# Patient Record
Sex: MALE | Race: WHITE | Employment: FULL TIME | URBAN - METROPOLITAN AREA
[De-identification: names, ages, dates, MRNs, and addresses within clinical notes are randomized per-mention and may not be internally consistent; named-entity substitution may affect disease eponyms.]

---

## 2017-01-03 ENCOUNTER — DOCUMENTATION ONLY (OUTPATIENT)
Dept: SLEEP MEDICINE | Age: 32
End: 2017-01-03

## 2017-05-30 RX ORDER — LOSARTAN POTASSIUM 100 MG/1
TABLET ORAL
Qty: 90 TAB | Refills: 3 | Status: SHIPPED | OUTPATIENT
Start: 2017-05-30 | End: 2018-06-01 | Stop reason: SDUPTHER

## 2017-05-30 RX ORDER — ATENOLOL 50 MG/1
TABLET ORAL
Qty: 90 TAB | Refills: 3 | Status: SHIPPED | OUTPATIENT
Start: 2017-05-30 | End: 2018-06-01 | Stop reason: SDUPTHER

## 2017-06-22 ENCOUNTER — OFFICE VISIT (OUTPATIENT)
Dept: CARDIOLOGY CLINIC | Age: 32
End: 2017-06-22

## 2017-06-22 VITALS
HEART RATE: 70 BPM | SYSTOLIC BLOOD PRESSURE: 126 MMHG | HEIGHT: 73 IN | RESPIRATION RATE: 22 BRPM | WEIGHT: 315 LBS | BODY MASS INDEX: 41.75 KG/M2 | OXYGEN SATURATION: 97 % | DIASTOLIC BLOOD PRESSURE: 70 MMHG

## 2017-06-22 DIAGNOSIS — E66.01 MORBID OBESITY DUE TO EXCESS CALORIES (HCC): ICD-10-CM

## 2017-06-22 DIAGNOSIS — F41.9 ANXIETY: ICD-10-CM

## 2017-06-22 DIAGNOSIS — I49.3 PVC'S (PREMATURE VENTRICULAR CONTRACTIONS): ICD-10-CM

## 2017-06-22 DIAGNOSIS — I10 ESSENTIAL HYPERTENSION: Primary | ICD-10-CM

## 2017-06-22 DIAGNOSIS — R00.2 PALPITATIONS: ICD-10-CM

## 2017-06-22 NOTE — MR AVS SNAPSHOT
Visit Information Date & Time Provider Department Dept. Phone Encounter #  
 6/22/2017 10:20 AM Latrice Victor MD [facility] Alexa Adams [phone] [id number] Your Appointments 12/22/2017 10:00 AM  
ESTABLISHED PATIENT with Latrice Victor MD  
[facility] ([address]) Appt Note: 6 MO FUP  
 [id number] Michelle Patrick [address] Sen [phone] [address] [address] [address] Upcoming Health Maintenance Date Due Pneumococcal 19-64 Medium Risk (1 of 1 - PPSV23) 1/4/2003 DTaP/Tdap/Td series (1 - Tdap) 1/4/2005 INFLUENZA AGE 9 TO ADULT 8/1/2017 Allergies as of 6/22/2017  Review Complete On: 6/22/2017 By: Jodi Fuentes LPN Severity Noted Reaction Type Reactions Penicillin G High 01/12/2012    Hives Current Immunizations  Never Reviewed No immunizations on file. Not reviewed this visit You Were Diagnosed With   
  
 Codes Comments Essential hypertension    -  Primary ICD-10-CM: I10 
ICD-9-CM: 401.9 Vitals BP Pulse Resp Height(growth percentile) Weight(growth percentile) SpO2  
 126/70 (BP 1 Location: Left arm, BP Patient Position: Sitting) 70 22 6' 1\" (1.854 m) 343 lb (155.6 kg) 97% BMI Smoking Status 45.25 kg/m2 Current Every Day Smoker Vitals History BMI and BSA Data Body Mass Index Body Surface Area  
 45.25 kg/m 2 2.83 m 2 Preferred Pharmacy Pharmacy Name Phone [id number] [address] [facility] [phone] Your Updated Medication List  
  
   
This list is accurate as of: 6/22/17 11:34 AM.  Always use your most recent med list.  
  
  
  
  
 allopurinol 300 mg tablet Commonly known as:  Aide Maxon Take  by mouth daily. atenolol 50 mg tablet Commonly known as:  TENORMIN  
 TAKE 1 TABLET BY MOUTH DAILY. FISH OIL 1,000 mg Cap Generic drug:  omega-3 fatty acids-vitamin e Take 1 Cap by mouth two (2) times a day. losartan 100 mg tablet Commonly known as:  COZAAR  
TAKE 1 TABLET BY MOUTH DAILY. We Performed the Following AMB POC EKG ROUTINE W/ 12 LEADS, INTER & REP [00556 CPT(R)] Introducing [facility] SERVICES! Dear Hai Pompa: 
Thank you for requesting a TixAlert account. Our records indicate that you already have an active TixAlert account. You can access your account anytime at [email or web address] Did you know that you can access your hospital and ER discharge instructions at any time in TixAlert? You can also review all of your test results from your hospital stay or ER visit. Additional Information If you have questions, please visit the Frequently Asked Questions section of the TixAlert website at [email or web address] Remember, TixAlert is NOT to be used for urgent needs. For medical emergencies, dial 911. Now available from your iPhone and Android! Please provide this summary of care documentation to your next provider. Your primary care clinician is listed as Niharika Santa. If you have any questions after today's visit, please call [phone].

## 2017-06-22 NOTE — PROGRESS NOTES
Yakelin Olvera MD    [address] [place], [address] [facility]    Office [phone],[place] [phone]  Pager [phone]    Caroline Huff is a 34 y.o. male is here for f/u visit for hx of PVC/HTN    Primary care physician:  Sascha Pimentel MD    Patient Active Problem List   Diagnosis Code    Essential hypertension, benign I10    PVC's (premature ventricular contractions) I49.3    Other and unspecified hyperlipidemia E78.5    Morbid obesity ([address] E66.01       Chief complaint:  Chief Complaint   Patient presents with    Hypertension     last seen 2015    Irregular Heart Beat     1 epsiode of palpitations    Anxiety       Assessment:  HTN  Palpitations - hx of PVC - occurs more often when he is anxious  ED visit - 5/14/15 - palpitations. Morbid obesity      Plan:   Continue meds/ BP controlled  Advised to discussed with primary care physician about treatment for anxiety. Counselled about wt loss/low eula diet/exercise -  Lipids per PCP  Patient wants to follow-up in 6 months. Follow-up 6 months/as needed    I appreciate the opportunity to be involved in Mr. Guerrier. See note below for details. Please do not hesitate to contact us with questions or concerns. Yakelin Olvera MD    Cardiac Testing/ Procedures: A. Cardiac Cath/PCI:    B.ECHO/SAMPSON:    C.StressNuclear/Stress ECHO/Stress test: Stress echo 2/2014 - 8 min;Nml; NmlEF    D. Vascular:    E. EP: 7/30/15 - SR/no PAC/1 PVC; Diary entries correlate with SR    F. Miscellaneous:    Subjective:  Caroline Huff is a 34 y.o. male who returns for follow up visit    Was doing well until recently when he had an episode of palpitations. Usually his symptoms are associated with anxiety. . No chest pain. No dyspnea.  No swelling LE.  has had a hard time losing weight    ROS:  (bold if positive, if negative)             Medications before admission:    Current Outpatient Prescriptions   Medication Sig Dispense    losartan (COZAAR) 100 mg tablet TAKE 1 TABLET BY MOUTH DAILY. 90 Tab    atenolol (TENORMIN) 50 mg tablet TAKE 1 TABLET BY MOUTH DAILY. 90 Tab    allopurinol (ZYLOPRIM) 300 mg tablet Take  by mouth daily.  omega-3 fatty acids-vitamin e (FISH OIL) 1,000 mg cap Take 1 Cap by mouth two (2) times a day. No current facility-administered medications for this visit. Physical Exam:  Visit Vitals    /70 (BP 1 Location: Left arm, BP Patient Position: Sitting)    Pulse 70    Resp 22    Ht 6' 1\" (1.854 m)    Wt 343 lb (155.6 kg)    SpO2 97%    BMI 45.25 kg/m2          Gen: Well-developed, well-nourished, in no acute distress, obese  Neck: Supple,No JVD, No Carotid Bruit,   Resp: No accessory muscle use, Clear breath sounds, No rales or rhonchi  Card: Regular Rate,Rythm,Normal S1, S2, No murmurs, rubs or gallop. No thrills.    Abd:  Soft, non-tender, non-distended,BS+,   MSK: No cyanosis  Skin: No rashes    Neuro: moving all four extremities , follows commands appropriately  Psych:  Good insight, oriented to person, place , alert, Nml Affect  LE: No edema    EKG: Sinus rhythm, normal axis, normal intervals      LABS:        Lab Results   Component Value Date/Time    WBC 10.7 05/24/2015 06:30 PM    HGB 14.1 05/24/2015 06:30 PM    HCT 42.4 05/24/2015 06:30 PM    PLATELET 244 76/10/0594 06:30 PM    MCV 88.1 05/24/2015 06:30 PM     Lab Results   Component Value Date/Time    Sodium 138 05/24/2015 06:30 PM    Potassium 4.2 05/24/2015 06:30 PM    Chloride 101 05/24/2015 06:30 PM    CO2 29 05/24/2015 06:30 PM    Anion gap 8 05/24/2015 06:30 PM    Glucose 108 05/24/2015 06:30 PM    BUN 14 05/24/2015 06:30 PM    Creatinine 0.88 05/24/2015 06:30 PM    BUN/Creatinine ratio 16 05/24/2015 06:30 PM    GFR est AA >60 05/24/2015 06:30 PM    GFR est non-AA >60 05/24/2015 06:30 PM    Calcium 9.2 05/24/2015 06:30 PM       No results found for: APTT  No results found for: INR  No components found for: LAST Raleigh Fernandez MD

## 2017-07-21 ENCOUNTER — HOSPITAL ENCOUNTER (EMERGENCY)
Age: 32
Discharge: HOME | End: 2017-07-21
Attending: EMERGENCY MEDICINE | Admitting: EMERGENCY MEDICINE
Payer: COMMERCIAL

## 2017-07-21 VITALS
DIASTOLIC BLOOD PRESSURE: 84 MMHG | SYSTOLIC BLOOD PRESSURE: 161 MMHG | RESPIRATION RATE: 16 BRPM | BODY MASS INDEX: 41.75 KG/M2 | HEIGHT: 73 IN | HEART RATE: 68 BPM | WEIGHT: 315 LBS | OXYGEN SATURATION: 99 % | TEMPERATURE: 98.5 F

## 2017-07-21 DIAGNOSIS — Z20.3 RABIES EXPOSURE: Primary | ICD-10-CM

## 2017-07-21 PROCEDURE — 96372 THER/PROPH/DIAG INJ SC/IM: CPT

## 2017-07-21 PROCEDURE — 90471 IMMUNIZATION ADMIN: CPT

## 2017-07-21 PROCEDURE — 90675 RABIES VACCINE IM: CPT | Performed by: EMERGENCY MEDICINE

## 2017-07-21 PROCEDURE — 99282 EMERGENCY DEPT VISIT SF MDM: CPT

## 2017-07-21 PROCEDURE — 74011250636 HC RX REV CODE- 250/636: Performed by: EMERGENCY MEDICINE

## 2017-07-21 PROCEDURE — 90375 RABIES IG IM/SC: CPT | Performed by: EMERGENCY MEDICINE

## 2017-07-21 RX ADMIN — RABIES IMMUNE GLOBULIN (HUMAN) 3225 UNITS: 150 INJECTION INTRAMUSCULAR at 12:47

## 2017-07-21 RX ADMIN — Medication 2.5 UNITS: at 12:45

## 2017-07-21 NOTE — ED PROVIDER NOTES
HPI Comments: 34 y.o. male with past medical history significant for HTN, hyperlipidemia and anxiety who presents ambulatory from home with chief complaint of animal bite. Pt states he was moving bats out of his home when one of the bats escaped and he felt \"something touch me\". Pt released all of the bats with the exception of one, who Animal Control tested for rabies. That bat was negative for rabies. Pt lives at home with his wife and 3year-old son, both of who have not had any contact with the bats. Pt is UTD on tetanus. There are no other acute medical concerns at this time. Social hx: current every day tobacco smoker; uses EtOH rarely; denies illicit drug use  PCP: Mica Howard MD    Note written by Marta Nolan, as dictated by Blair Barragan MD 11:34 AM        The history is provided by the patient. Past Medical History:   Diagnosis Date    Anxiety     Asthma     DJD (degenerative joint disease) of knee     DJD (degenerative joint disease) of lumbar spine     Essential hypertension     Gout     Hyperlipidemia     Morbid obesity ([address]     Psoriasis        Past Surgical History:   Procedure Laterality Date    HX ORTHOPAEDIC      arthroscopic bilateral knees    ORAL SURGERY PROCEDURE      SINUS SURGERY PROC UNLISTED           Family History:   Problem Relation Age of Onset   [facility] [place] Mother     Hypertension Father     Cancer Father        Social History     Social History    Marital status: SINGLE     Spouse name: N/A    Number of children: N/A    Years of education: N/A     Occupational History    Not on file.      Social History Main Topics    Smoking status: Current Every Day Smoker    Smokeless tobacco: Never Used    Alcohol use Yes      Comment: rare    Drug use: No    Sexual activity: Yes     Other Topics Concern    Not on file     Social History Narrative         ALLERGIES: Penicillin g    Review of Systems   Skin:        Possible animal bite   All other systems reviewed and are negative. Vitals:    07/21/17 1121   BP: 161/84   Pulse: 68   Resp: 16   Temp: 98.5 °F (36.9 °C)   SpO2: 99%   Weight: (!) 161 kg (355 lb)   Height: 6' 1\" (1.854 m)            Physical Exam   Physical Examination: General appearance - alert, well appearing, and in no distress, oriented to person, place, and time and normal appearing weight  Eyes - pupils equal and reactive, extraocular eye movements intact  Neck - supple, no significant adenopathy  Chest - clear to auscultation, no wheezes, rales or rhonchi, symmetric air entry  Heart - normal rate, regular rhythm, normal S1, S2, no murmurs, rubs, clicks or gallops  Abdomen - soft, nontender, nondistended, no masses or organomegaly  Back exam - full range of motion, no tenderness, palpable spasm or pain on motion  Neurological - alert, oriented, normal speech, no focal findings or movement disorder noted  Musculoskeletal - no joint tenderness, deformity or swelling  Extremities - peripheral pulses normal, no pedal edema, no clubbing or cyanosis  Skin - normal coloration and turgor, no rashes, no suspicious skin lesions noted  MDM  Number of Diagnoses or Management Options  Rabies exposure:   Patient Progress  Patient progress: improved    ED Course       Procedures  Will give rabies series.

## 2017-07-21 NOTE — DISCHARGE INSTRUCTIONS
July 24, 2017 July 28, 2017 August 4, 2017          Preventing Rabies Infection: Care Instructions  Your Care Instructions  Rabies is a disease caused by a virus that can affect the brain and nervous system. You can get rabies when you are exposed to an animal that has rabies. This can happen through a bite, scratch, or other contact. Your doctor can use two medicines to help your body fight the virus before it causes an infection. One is rabies immunoglobulin. It works by giving your body a type of protein called an antibody to stop the rabies virus. The other medicine is the rabies vaccine. It helps your body produce its own protection against the rabies virus. When you get these shots before serious symptoms appear, you should not get infected with rabies. Your doctor will give you a shot schedule. Make sure that you do not miss any doses. You need to get all the doses for the rabies vaccine to work. If you are exposed and have not been vaccinated against rabies, you should get 4 doses of rabies vaccine. · Get 1 dose right away. You should also get a rabies immunoglobulin shot when you get the first dose. · Get more doses on the 3rd, 7th, and 14th days. If you're exposed and have been vaccinated before, you should get 2 doses of rabies vaccine. · Get 1 dose right away. In this case, you do not need rabies immunoglobulin. · Get another on the 3rd day. You might also get a shot to prevent rabies if you handle animals often. Or you may get one if you plan to travel to places where rabies is a risk. Follow-up care is a key part of your treatment and safety. Be sure to make and go to all appointments, and call your doctor if you are having problems. It's also a good idea to know your test results and keep a list of the medicines you take. How can you care for yourself at home? · Do not drive or use machines if the rabies vaccine makes you dizzy. · Both types of rabies shots may cause fever.  And both may cause pain or stiffness where you got the shot. If your doctor recommends it, take an over-the-counter pain medicine, such as acetaminophen (Tylenol), ibuprofen (Advil, Motrin), or naproxen (Aleve), as needed. Read and follow all instructions on the label. · Do not take two or more pain medicines at the same time unless the doctor told you to. Many pain medicines have acetaminophen, which is Tylenol. Too much acetaminophen (Tylenol) can be harmful. To prevent contact with rabies  · Make sure your dog, cat, or ferret gets the rabies vaccine. · Avoid all contact with bats. · Never touch or try to pet or catch wild animals. This includes raccoons, skunks, foxes, and coyotes. · Secure trash and other items that attract animals. · Secure open areas of your home. Close off pet doors, chimneys, unscreened windows, or any place that wild or stray animals could get in. · Never handle a dead or wounded animal.  To take care of an animal bite  · Wash any animal bite or area of exposure right away. Use soap and water. · Call your doctor to find out how to care for your wound. · If the animal is a dog, cat, or pet ferret, try to find and contact the owner. If you can't find the owner, call the local animal control to safely catch the animal.  · If the animal is wild, do not try to catch or kill it. Find out what type of animal it is. Note whether it is acting normal. Report it to the local animal control. · Contact the local or state health department to report a bite or a severe scratch from an animal.  · Ask your doctor if you need a tetanus shot. When should you call for help? Call your doctor now or seek immediate medical care if:  · You have been bitten or scratched by an animal, and you do not know if it has had rabies vaccine. · You develop hives or a skin rash after you get rabies shots.   Watch closely for changes in your health, and be sure to contact your doctor if you notice any changes in your health. Where can you learn more? Go to http://maurice-kylee.info/. Enter U105 in the search box to learn more about \"Preventing Rabies Infection: Care Instructions. \"  Current as of: March 3, 2017  Content Version: 11.3  © 0563-1504 [employer]. Care instructions adapted under license by [facility] (which disclaims liability or warranty for this information). If you have questions about a medical condition or this instruction, always ask your healthcare professional. [address] any warranty or liability for your use of this information. We hope that we have addressed all of your medical concerns. The examination and treatment you received in the Emergency Department were for an emergent problem and were not intended as complete care. It is important that you follow up with your healthcare provider(s) for ongoing care. If your symptoms worsen or do not improve as expected, and you are unable to reach your usual health care provider(s), you should return to the Emergency Department. Today's healthcare is undergoing tremendous change, and patient satisfaction surveys are one of the many tools to assess the quality of medical care. You may receive a survey from the [facility] regarding your experience in the Emergency Department. I hope that your experience has been completely positive, particularly the medical care that I provided. As such, please participate in the survey; anything less than excellent does not meet my expectations or intentions. [facility] and [address] [place] [address] participate in nationally recognized quality of care measures. If your blood pressure is greater than 120/80, as reported below, we urge that you seek medical care to address the potential of high blood pressure, commonly known as hypertension.    Hypertension can be hereditary or can be caused by certain medical conditions, pain, stress, or \"white coat syndrome. \"       Please make an appointment with your health care provider(s) for follow up of your Emergency Department visit. VITALS:   Patient Vitals for the past 8 hrs:   Temp Pulse Resp BP SpO2   07/21/17 1121 98.5 °F (36.9 °C) 68 16 161/84 99 %          Thank you for allowing us to provide you with medical care today. We realize that you have many choices for your emergency care needs. Please choose us in the future for any continued health care needs. Annelle Cabot Acie Butts, [address]: [phone]            No results found for this or any previous visit (from the past 24 hour(s)). No results found.

## 2017-07-24 ENCOUNTER — HOSPITAL ENCOUNTER (OUTPATIENT)
Dept: INFUSION THERAPY | Age: 32
Discharge: HOME | End: 2017-07-24
Payer: COMMERCIAL

## 2017-07-24 VITALS
TEMPERATURE: 98.1 F | DIASTOLIC BLOOD PRESSURE: 94 MMHG | RESPIRATION RATE: 18 BRPM | SYSTOLIC BLOOD PRESSURE: 153 MMHG | HEART RATE: 74 BPM

## 2017-07-24 PROCEDURE — 90471 IMMUNIZATION ADMIN: CPT

## 2017-07-24 PROCEDURE — 90675 RABIES VACCINE IM: CPT | Performed by: PHYSICIAN ASSISTANT

## 2017-07-24 PROCEDURE — 96372 THER/PROPH/DIAG INJ SC/IM: CPT

## 2017-07-24 PROCEDURE — 74011250636 HC RX REV CODE- 250/636: Performed by: PHYSICIAN ASSISTANT

## 2017-07-24 RX ADMIN — RABIES VACCINE 2.5 UNITS: KIT at 16:53

## 2017-07-24 NOTE — PROGRESS NOTES
[facility] SHORT VISIT NOTE    1645 hrs    Pt arrived to [facility] ambulatory and in no distress for Rabivert 1/3 Denies any new complaints. Blood pressure (!) 153/94, pulse 74, temperature 98.1 °F (36.7 °C), resp. rate 18. Medication given:  Rabivert IM Left Deltoid    1700 hrs   Discharged home ambulatory and in no distress. Tolerated treatment well.  Next appointment 07/28/17 @ 5 pm.

## 2017-07-26 NOTE — ED NOTES
Infusion center called on 7/24/17 stating that they do not have follow up vaccines for days 3,7,&14. Rx faxed over to the infusion center for pt.    Gissel Rivera PA-C

## 2017-07-28 ENCOUNTER — HOSPITAL ENCOUNTER (OUTPATIENT)
Dept: INFUSION THERAPY | Age: 32
Discharge: HOME | End: 2017-07-28
Payer: COMMERCIAL

## 2017-07-28 VITALS
DIASTOLIC BLOOD PRESSURE: 78 MMHG | HEART RATE: 83 BPM | TEMPERATURE: 98.4 F | SYSTOLIC BLOOD PRESSURE: 139 MMHG | RESPIRATION RATE: 18 BRPM

## 2017-07-28 PROCEDURE — 90471 IMMUNIZATION ADMIN: CPT

## 2017-07-28 PROCEDURE — 90675 RABIES VACCINE IM: CPT | Performed by: PHYSICIAN ASSISTANT

## 2017-07-28 PROCEDURE — 96372 THER/PROPH/DIAG INJ SC/IM: CPT

## 2017-07-28 PROCEDURE — 74011250636 HC RX REV CODE- 250/636: Performed by: PHYSICIAN ASSISTANT

## 2017-07-28 RX ADMIN — RABIES VACCINE 2.5 UNITS: KIT at 17:44

## 2017-07-28 NOTE — PROGRESS NOTES
129 [facility] SHORT VISIT NOTE    1730 hrs  Pt arrived to [place] ambulatory and in no distress for Rabavert. Denies any new complaints. Blood pressure 139/78, pulse 83, temperature 98.4 °F (36.9 °C), resp. rate 18. Medication given:  Rabavert IM Left arm    1750 hrs Discharged home ambulatory and in no distress. Tolerated treatment well.  Next appointment 08/04/17 @ 5 pm.

## 2017-08-04 ENCOUNTER — HOSPITAL ENCOUNTER (OUTPATIENT)
Dept: INFUSION THERAPY | Age: 32
Discharge: HOME | End: 2017-08-04
Payer: COMMERCIAL

## 2017-08-04 VITALS
SYSTOLIC BLOOD PRESSURE: 137 MMHG | TEMPERATURE: 98.1 F | DIASTOLIC BLOOD PRESSURE: 74 MMHG | RESPIRATION RATE: 18 BRPM | HEART RATE: 79 BPM

## 2017-08-04 PROCEDURE — 90675 RABIES VACCINE IM: CPT | Performed by: PHYSICIAN ASSISTANT

## 2017-08-04 PROCEDURE — 74011250636 HC RX REV CODE- 250/636: Performed by: PHYSICIAN ASSISTANT

## 2017-08-04 PROCEDURE — 96372 THER/PROPH/DIAG INJ SC/IM: CPT

## 2017-08-04 PROCEDURE — 90471 IMMUNIZATION ADMIN: CPT

## 2017-08-04 RX ADMIN — RABIES VACCINE 2.5 UNITS: KIT at 17:22

## 2017-08-04 NOTE — PROGRESS NOTES
SHORT OPIC NOTE    Patient arrived at [facility] ambulatory and in no distress for 3/3 Rabies. Assessment completed and patient denied complaints. Patient Vitals for the past 12 hrs:   Temp Pulse Resp BP   08/04/17 1716 98.1 °F (36.7 °C) 79 18 137/74       Medication received:  Rabies IM (left shoulder)    Patient tolerated procedure well and without difficulty. DC'd from [facility]. Next appointment not  scheduled.

## 2017-08-04 NOTE — PROGRESS NOTES
Problem: Knowledge Deficit  Goal: *Verbalizes understanding of procedures and medications  Outcome: Progressing Towards Goal  Patient here for rabies vaccine.

## 2017-10-25 ENCOUNTER — TELEPHONE (OUTPATIENT)
Dept: CARDIOLOGY CLINIC | Age: 32
End: 2017-10-25

## 2017-10-26 ENCOUNTER — OFFICE VISIT (OUTPATIENT)
Dept: CARDIOLOGY CLINIC | Age: 32
End: 2017-10-26

## 2017-10-26 VITALS
SYSTOLIC BLOOD PRESSURE: 142 MMHG | HEART RATE: 68 BPM | DIASTOLIC BLOOD PRESSURE: 80 MMHG | WEIGHT: 315 LBS | HEIGHT: 73 IN | OXYGEN SATURATION: 98 % | BODY MASS INDEX: 41.75 KG/M2

## 2017-10-26 DIAGNOSIS — I49.3 PVC'S (PREMATURE VENTRICULAR CONTRACTIONS): ICD-10-CM

## 2017-10-26 DIAGNOSIS — R07.9 CHEST PAIN, UNSPECIFIED TYPE: ICD-10-CM

## 2017-10-26 DIAGNOSIS — I10 ESSENTIAL HYPERTENSION, BENIGN: Primary | ICD-10-CM

## 2017-10-26 DIAGNOSIS — E66.01 MORBID OBESITY (HCC): ICD-10-CM

## 2017-10-26 DIAGNOSIS — R06.00 DYSPNEA, UNSPECIFIED TYPE: ICD-10-CM

## 2017-10-26 RX ORDER — PANTOPRAZOLE SODIUM 40 MG/1
40 TABLET, DELAYED RELEASE ORAL DAILY
COMMUNITY
End: 2018-03-09

## 2017-10-26 RX ORDER — QUETIAPINE FUMARATE 50 MG/1
50 TABLET, FILM COATED ORAL
COMMUNITY
End: 2018-03-09

## 2017-10-26 NOTE — MR AVS SNAPSHOT
Visit Information Date & Time Provider Department Dept. Phone Encounter #  
 10/26/2017  1:40 PM Sidra Anaya MD [facility] [place] [place] [phone] [id number] Your Appointments 12/22/2017 10:00 AM  
ESTABLISHED PATIENT with Sidra Anaya MD  
[facility] ([address]) Appt Note: 6 MO FUP  
 [id number] [address] [phone] [id number] [place] [address] Upcoming Health Maintenance Date Due Pneumococcal 19-64 Medium Risk (1 of 1 - PPSV23) 1/4/2003 DTaP/Tdap/Td series (1 - Tdap) 1/4/2005 INFLUENZA AGE 9 TO ADULT 8/1/2017 Allergies as of 10/26/2017  Review Complete On: 10/26/2017 By: Dale Section Severity Noted Reaction Type Reactions Penicillin G High 01/12/2012    Hives Current Immunizations  Reviewed on 8/4/2017 Name Date Rabies Immune Globulin 7/21/2017 12:47 PM  
 Rabies- IM Fibroblast Culture 8/4/2017  5:22 PM, 7/28/2017  5:44 PM,  Incomplete, 7/24/2017  4:53 PM, 7/21/2017 12:45 PM  
  
 Not reviewed this visit You Were Diagnosed With   
  
 Codes Comments Essential hypertension, benign    -  Primary ICD-10-CM: I10 
ICD-9-CM: 401.1 PVC's (premature ventricular contractions)     ICD-10-CM: I49.3 ICD-9-CM: 427.69 Morbid obesity ([address]     ICD-10-CM: E66.01 
ICD-9-CM: 278.01 Vitals BP Pulse Height(growth percentile) Weight(growth percentile) SpO2 BMI  
 142/80 (BP 1 Location: Left arm) 68 6' 1\" (1.854 m) 348 lb 9.6 oz (158.1 kg) 98% 45.99 kg/m2 Smoking Status Former Smoker Vitals History BMI and BSA Data Body Mass Index Body Surface Area 45.99 kg/m 2 2.85 m 2 Preferred Pharmacy Pharmacy Name Phone [id number] [address] [facility] [phone] Your Updated Medication List  
  
 This list is accurate as of: 10/26/17  2:16 PM.  Always use your most recent med list.  
  
  
  
  
 allopurinol 300 mg tablet Commonly known as:  Almetta Hefty Take  by mouth daily. atenolol 50 mg tablet Commonly known as:  TENORMIN  
TAKE 1 TABLET BY MOUTH DAILY. FISH OIL 1,000 mg Cap Generic drug:  omega-3 fatty acids-vitamin e Take 1 Cap by mouth two (2) times a day. losartan 100 mg tablet Commonly known as:  COZAAR  
TAKE 1 TABLET BY MOUTH DAILY. PROTONIX 40 mg tablet Generic drug:  pantoprazole Take 40 mg by mouth daily. SEROquel 50 mg tablet Generic drug:  QUEtiapine Take 50 mg by mouth nightly. Introducing [facility]! Dear Casimiro Deutsch: 
Thank you for requesting a Patterns account. Our records indicate that you already have an active Patterns account. You can access your account anytime at [email or web address] Did you know that you can access your hospital and ER discharge instructions at any time in Patterns? You can also review all of your test results from your hospital stay or ER visit. Additional Information If you have questions, please visit the Frequently Asked Questions section of the Patterns website at [email or web address] Remember, Patterns is NOT to be used for urgent needs. For medical emergencies, dial 911. Now available from your iPhone and Android! Please provide this summary of care documentation to your next provider. Your primary care clinician is listed as Katrina Mejía. If you have any questions after today's visit, please call [phone].

## 2017-10-27 ENCOUNTER — TELEPHONE (OUTPATIENT)
Dept: SLEEP MEDICINE | Age: 32
End: 2017-10-27

## 2017-10-27 DIAGNOSIS — G25.9 MOVEMENT DISORDER: ICD-10-CM

## 2017-10-27 DIAGNOSIS — G47.33 OSA (OBSTRUCTIVE SLEEP APNEA): Primary | ICD-10-CM

## 2017-10-27 NOTE — TELEPHONE ENCOUNTER
Patient called to schedule PSG which was cancelled by him earlier. He was last seen 11/14/16. Please write a new order for PSG. Thank you.   Gemma, I have scheduled him for Jan 5th 2018, per his request.

## 2017-10-28 NOTE — TELEPHONE ENCOUNTER
Orders Placed This Encounter    POLYSOMNOGRAPHY 1 NIGHT     Standing Status:   Future     Standing Expiration Date:   4/28/2018     Order Specific Question:   Reason for Exam     Answer:   SURENDRA / Movement Disorder

## 2017-11-10 ENCOUNTER — CLINICAL SUPPORT (OUTPATIENT)
Dept: CARDIOLOGY CLINIC | Age: 32
End: 2017-11-10

## 2017-11-10 DIAGNOSIS — R06.02 SOB (SHORTNESS OF BREATH): ICD-10-CM

## 2017-11-10 DIAGNOSIS — E66.01 MORBID OBESITY (HCC): ICD-10-CM

## 2017-11-10 DIAGNOSIS — I10 ESSENTIAL HYPERTENSION, BENIGN: ICD-10-CM

## 2017-11-10 DIAGNOSIS — I49.3 PVC'S (PREMATURE VENTRICULAR CONTRACTIONS): Primary | ICD-10-CM

## 2017-11-10 NOTE — PROGRESS NOTES
Explained procedure to patient, monitoring EKG/HR/BP, obtaining resting images, walking on treadmill, obtaining post exercise images, and risks/discomforts. All concerns and questions addressed. Consent obtained. See scanned report. Dr. Esthela Maloney ordered and Dr. Esthela Maloney read study. ID verified per protocol. Pt  reported no symptoms at completion of protocol.

## 2017-11-15 ENCOUNTER — DOCUMENTATION ONLY (OUTPATIENT)
Dept: CARDIOLOGY CLINIC | Age: 32
End: 2017-11-15

## 2018-01-26 ENCOUNTER — HOSPITAL ENCOUNTER (OUTPATIENT)
Dept: SLEEP MEDICINE | Age: 33
Discharge: HOME | End: 2018-01-26
Attending: INTERNAL MEDICINE
Payer: COMMERCIAL

## 2018-01-26 VITALS
SYSTOLIC BLOOD PRESSURE: 141 MMHG | DIASTOLIC BLOOD PRESSURE: 82 MMHG | TEMPERATURE: 98.4 F | OXYGEN SATURATION: 99 % | HEART RATE: 78 BPM | HEIGHT: 72 IN | BODY MASS INDEX: 42.66 KG/M2 | WEIGHT: 315 LBS

## 2018-01-26 DIAGNOSIS — G25.9 MOVEMENT DISORDER: ICD-10-CM

## 2018-01-26 DIAGNOSIS — G47.33 OSA (OBSTRUCTIVE SLEEP APNEA): ICD-10-CM

## 2018-01-26 PROCEDURE — 95810 POLYSOM 6/> YRS 4/> PARAM: CPT | Performed by: INTERNAL MEDICINE

## 2018-01-29 ENCOUNTER — TELEPHONE (OUTPATIENT)
Dept: SLEEP MEDICINE | Age: 33
End: 2018-01-29

## 2018-01-29 DIAGNOSIS — G47.33 OSA (OBSTRUCTIVE SLEEP APNEA): Primary | ICD-10-CM

## 2018-01-29 DIAGNOSIS — I10 ESSENTIAL HYPERTENSION, BENIGN: ICD-10-CM

## 2018-01-30 ENCOUNTER — DOCUMENTATION ONLY (OUTPATIENT)
Dept: SLEEP MEDICINE | Age: 33
End: 2018-01-30

## 2018-01-30 NOTE — TELEPHONE ENCOUNTER
Fransisco De Paz is to be contacted by lead sleep technologist regarding results of Sleep Testing which was indicative of an average AHI of 12 per hour with an SpO2 addison of 77% and SpO2 of < 88% being 4.7 minutes. An APAP prescription has been written and patient will be contacted by office staff regarding follow-up  in 2-3 months after initiation of therapy. Encounter Diagnoses   Name Primary?  SURENDRA (obstructive sleep apnea) Yes    Essential hypertension, benign        Orders Placed This Encounter    AMB SUPPLY ORDER     Diagnosis: (G47.33) SURENDRA (obstructive sleep apnea)  (primary encounter diagnosis)     Positive Airway Pressure Therapy: Duration of need: 99 months. Respironics DreamStation ( Unit - Auto set Mode): Auto - PAP: 4 - 20 cmH2O; Optistart enabled. Ramp Time: 30 Minutes; Flex: 2. Remote monitoring enrollment.  Heated Humidifier     Oral/Nasal Combo Mask 1 every 3 months.  Oral Cushion Combo Mask (Replace) 2 per month.  Nasal Pillows Combo Mask (Replace) 2 per month.  Full Face Mask 1 every 3 months.  Full Face Mask Cushion 1 per month.  Nasal Cushion (Replace) 2 per month.  Nasal Pillows (Replace) 2 per month.  Nasal Interface Mask 1 every 3 months.  Headgear 1 every 6 months.  Chinstrap 1 every 6 months.  Tubing 1 every 3 months.  Filter(s) Disposable 2 per month.  Filter(s) Non-Disposable 1 every 6 months.  Oral Interface 1 every 3 months. [address] for [place]ed [place] (Replace) 1 every 6 months.  Tubing with heating element 1 every 3 months. Perform Mask Fitting per patient preference and comfort - replace as above. Jessica Washington MD, FAASM; NPI: [id number]  Electronically signed. 01/30/18

## 2018-01-31 ENCOUNTER — DOCUMENTATION ONLY (OUTPATIENT)
Dept: SLEEP MEDICINE | Age: 33
End: 2018-01-31

## 2018-03-09 ENCOUNTER — OFFICE VISIT (OUTPATIENT)
Dept: CARDIOLOGY CLINIC | Age: 33
End: 2018-03-09

## 2018-03-09 VITALS
SYSTOLIC BLOOD PRESSURE: 136 MMHG | OXYGEN SATURATION: 98 % | HEIGHT: 72 IN | HEART RATE: 72 BPM | RESPIRATION RATE: 20 BRPM | WEIGHT: 315 LBS | BODY MASS INDEX: 42.66 KG/M2 | DIASTOLIC BLOOD PRESSURE: 84 MMHG

## 2018-03-09 DIAGNOSIS — I10 ESSENTIAL HYPERTENSION, BENIGN: Primary | ICD-10-CM

## 2018-03-09 DIAGNOSIS — E66.01 MORBID OBESITY (HCC): ICD-10-CM

## 2018-03-09 DIAGNOSIS — I49.3 PVC'S (PREMATURE VENTRICULAR CONTRACTIONS): ICD-10-CM

## 2018-03-09 NOTE — PROGRESS NOTES
Donald Sanford MD    [address] [facility] [place], [address] [facility]    Office [phone],RTA [phone]  Pager [phone]    Silvia English is a 28 y.o. male is here for f/u visit . Primary care physician:  Baldomero Torres MD    Patient Active Problem List   Diagnosis Code    Essential hypertension, benign I10    PVC's (premature ventricular contractions) I49.3    Other and unspecified hyperlipidemia E78.5    Morbid obesity ([address] E66.01       Chief complaint:  Chief Complaint   Patient presents with    Chest Pain (Angina)    Hypertension    Palpitations     Dear Dr. Fabiana Dickey,    I had the pleasure of seeing Mr. Andrew Kenyon in the office today. Assessment:    HTN  Palpitations - hx of PVC - occurs more often when he is anxious  ED visit - 5/14/15 - palpitations. Morbid obesity      Plan:     Stress echocardiogram - nml 11/22/17  Counselled about wt loss/low eula diet/exercise -  Lipids per PCP  F/u in 6 mths/prn    I appreciate the opportunity to be involved in Mr. Guerrier. See note below for details. Please do not hesitate to contact us with questions or concerns. Donald Sanford MD    Cardiac Testing/ Procedures: A. Cardiac Cath/PCI:    B.ECHO/SAMPSON:    C.StressNuclear/Stress ECHO/Stress test: Stress echocardiogram November 2017-7 min 31 sec/normal    Stress echo 2/2014 - 8 min;Nml; NmlEF    D. Vascular:    E. EP: 7/30/15 - SR/no PAC/1 PVC; Diary entries correlate with SR    F. Miscellaneous:    Subjective:  Silvia English is a 28 y.o. male who returns for follow up visit    Patient states that he does not have any chest pain or dyspnea. However has trouble losing weight. He has gained weight since last visit. Also trying to quit smoking. ROS:  (bold if positive, if negative)             Medications before admission:    Current Outpatient Prescriptions   Medication Sig Dispense    losartan (COZAAR) 100 mg tablet TAKE 1 TABLET BY MOUTH DAILY. 90 Tab    atenolol (TENORMIN) 50 mg tablet TAKE 1 TABLET BY MOUTH DAILY. 90 Tab    allopurinol (ZYLOPRIM) 300 mg tablet Take  by mouth daily.  omega-3 fatty acids-vitamin e (FISH OIL) 1,000 mg cap Take 1 Cap by mouth two (2) times a day. No current facility-administered medications for this visit. Physical Exam:  Visit Vitals    /84 (BP 1 Location: Left arm)    Pulse 72    Resp 20    Ht 6' (1.829 m)    Wt (!) 374 lb (169.6 kg)    SpO2 98%    BMI 50.72 kg/m2          Gen: Well-developed, well-nourished, in no acute distress, obese  Neck: Supple,No JVD, No Carotid Bruit,   Resp: No accessory muscle use, Clear breath sounds, No rales or rhonchi  Card: Regular Rate,Rythm,Normal S1, S2, No murmurs, rubs or gallop. No thrills.    Abd:  Soft, non-tender, non-distended,BS+,   MSK: No cyanosis  Skin: No rashes    Neuro: moving all four extremities , follows commands appropriately  Psych:  Good insight, oriented to person, place , alert, Nml Affect  LE: No edema    EKG: Sinus rhythm, normal axis, normal intervals      LABS:        Lab Results   Component Value Date/Time    WBC 10.7 05/24/2015 06:30 PM    HGB 14.1 05/24/2015 06:30 PM    HCT 42.4 05/24/2015 06:30 PM    PLATELET 506 23/07/5229 06:30 PM    MCV 88.1 05/24/2015 06:30 PM     Lab Results   Component Value Date/Time    Sodium 138 05/24/2015 06:30 PM    Potassium 4.2 05/24/2015 06:30 PM    Chloride 101 05/24/2015 06:30 PM    CO2 29 05/24/2015 06:30 PM    Anion gap 8 05/24/2015 06:30 PM    Glucose 108 (H) 05/24/2015 06:30 PM    BUN 14 05/24/2015 06:30 PM    Creatinine 0.88 05/24/2015 06:30 PM    BUN/Creatinine ratio 16 05/24/2015 06:30 PM    GFR est AA >60 05/24/2015 06:30 PM    GFR est non-AA >60 05/24/2015 06:30 PM    Calcium 9.2 05/24/2015 06:30 PM       No results found for: APTT  No results found for: INR  No components found for: Kanwal Greco MD

## 2018-03-09 NOTE — MR AVS SNAPSHOT
[address] [facility] [address] [facility] 600 1007 [facility] 
[phone] Patient: Nilam Etienne MRN: [id number] OST:[birth year] Visit Information Date & Time Provider Department Dept. Phone Encounter #  
 3/9/2018  1:20 PM Nati Sánchez MD [facility] Mary Barrett [phone] [id number] Your Appointments 5/7/2018 11:20 AM  
Any with Hugo Banerjee MD  
[facility] Compute ([address]) Appt Note: 1st adherence [address] [facility] [address] [id number] 0[address] [facility] [phone] Upcoming Health Maintenance Date Due DTaP/Tdap/Td series (1 - Tdap) 1/4/2005 Influenza Age 5 to Adult 8/1/2017 Allergies as of 3/9/2018  Review Complete On: 3/9/2018 By: Harmony Bowers Severity Noted Reaction Type Reactions Penicillin G High 01/12/2012    Hives Current Immunizations  Reviewed on 8/4/2017 Name Date Rabies Immune Globulin 7/21/2017 12:47 PM  
 Rabies- IM Fibroblast Culture 8/4/2017  5:22 PM, 7/28/2017  5:44 PM,  Incomplete, 7/24/2017  4:53 PM, 7/21/2017 12:45 PM  
  
 Not reviewed this visit You Were Diagnosed With   
  
 Codes Comments Essential hypertension, benign    -  Primary ICD-10-CM: I10 
ICD-9-CM: 401.1 PVC's (premature ventricular contractions)     ICD-10-CM: I49.3 ICD-9-CM: 427.69 Morbid obesity ([facility] [address]     ICD-10-CM: E66.01 
ICD-9-CM: 278.01 Vitals BP Pulse Resp Height(growth percentile) Weight(growth percentile) SpO2  
 136/84 (BP 1 Location: Left arm) 72 20 6' (1.829 m) (!) 374 lb (169.6 kg) 98% BMI Smoking Status 50.72 kg/m2 Former Smoker Vitals History BMI and BSA Data Body Mass Index Body Surface Area 50.72 kg/m 2 2.94 m 2 Preferred Pharmacy Pharmacy Name Phone [facility] #[id number]- [address] AT [facility] [phone] Your Updated Medication List  
  
   
This list is accurate as of 3/9/18  2:04 PM.  Always use your most recent med list.  
  
  
  
  
 allopurinol 300 mg tablet Commonly known as:  Fidel Card Take  by mouth daily. atenolol 50 mg tablet Commonly known as:  TENORMIN  
TAKE 1 TABLET BY MOUTH DAILY. FISH OIL 1,000 mg Cap Generic drug:  omega-3 fatty acids-vitamin e Take 1 Cap by mouth two (2) times a day. losartan 100 mg tablet Commonly known as:  COZAAR  
TAKE 1 TABLET BY MOUTH DAILY. We Performed the Following AMB POC EKG ROUTINE W/ 12 LEADS, INTER & REP [89741 CPT(R)] Introducing [facility]! Dear Riri Ambriz: 
Thank you for requesting a The[employer] account. Our records indicate that you already have an active TheraVid account. You can access your account anytime at [email or web address]. [employer]ative Software Did you know that you can access your hospital and ER discharge instructions at any time in The[employer]Vid? You can also review all of your test results from your hospital stay or ER visit. Additional Information If you have questions, please visit the Frequently Asked Questions section of the TheraVid website at [email or web address] [employer]/. Remember, TheraVid is NOT to be used for urgent needs. For medical emergencies, dial 911. Now available from your iPhone and Android! Please provide this summary of care documentation to your next provider. Your primary care clinician is listed as Carlos Desouza. If you have any questions after today's visit, please call [phone].

## 2018-05-04 ENCOUNTER — DOCUMENTATION ONLY (OUTPATIENT)
Dept: SLEEP MEDICINE | Age: 33
End: 2018-05-04

## 2018-06-03 ENCOUNTER — HOSPITAL ENCOUNTER (EMERGENCY)
Age: 33
Discharge: HOME | End: 2018-06-03
Attending: EMERGENCY MEDICINE
Payer: COMMERCIAL

## 2018-06-03 ENCOUNTER — APPOINTMENT (OUTPATIENT)
Dept: GENERAL RADIOLOGY | Age: 33
End: 2018-06-03
Attending: EMERGENCY MEDICINE
Payer: COMMERCIAL

## 2018-06-03 VITALS
WEIGHT: 315 LBS | HEIGHT: 73 IN | HEART RATE: 105 BPM | DIASTOLIC BLOOD PRESSURE: 73 MMHG | BODY MASS INDEX: 41.75 KG/M2 | OXYGEN SATURATION: 99 % | RESPIRATION RATE: 18 BRPM | TEMPERATURE: 99 F | SYSTOLIC BLOOD PRESSURE: 161 MMHG

## 2018-06-03 DIAGNOSIS — J45.51 SEVERE PERSISTENT ASTHMA WITH ACUTE EXACERBATION: Primary | ICD-10-CM

## 2018-06-03 PROCEDURE — 74011250637 HC RX REV CODE- 250/637: Performed by: EMERGENCY MEDICINE

## 2018-06-03 PROCEDURE — 93005 ELECTROCARDIOGRAM TRACING: CPT

## 2018-06-03 PROCEDURE — 74011000250 HC RX REV CODE- 250: Performed by: EMERGENCY MEDICINE

## 2018-06-03 PROCEDURE — 74011636637 HC RX REV CODE- 636/637: Performed by: EMERGENCY MEDICINE

## 2018-06-03 PROCEDURE — 77030018744 HC FLOMTR PEAK FLO -A

## 2018-06-03 PROCEDURE — 94640 AIRWAY INHALATION TREATMENT: CPT

## 2018-06-03 PROCEDURE — 71046 X-RAY EXAM CHEST 2 VIEWS: CPT

## 2018-06-03 PROCEDURE — 99283 EMERGENCY DEPT VISIT LOW MDM: CPT

## 2018-06-03 PROCEDURE — 77030013140 HC MSK NEB VYRM -A

## 2018-06-03 RX ORDER — PREDNISONE 50 MG/1
50 TABLET ORAL DAILY
Qty: 4 TAB | Refills: 0 | Status: SHIPPED | OUTPATIENT
Start: 2018-06-03 | End: 2018-06-07

## 2018-06-03 RX ORDER — PREDNISONE 20 MG/1
60 TABLET ORAL
Status: COMPLETED | OUTPATIENT
Start: 2018-06-03 | End: 2018-06-03

## 2018-06-03 RX ORDER — LIDOCAINE HYDROCHLORIDE 20 MG/ML
15 SOLUTION OROPHARYNGEAL AS NEEDED
COMMUNITY
End: 2018-06-03

## 2018-06-03 RX ORDER — PROMETHAZINE HYDROCHLORIDE AND DEXTROMETHORPHAN HYDROBROMIDE 6.25; 15 MG/5ML; MG/5ML
5 SYRUP ORAL
COMMUNITY
End: 2018-06-03

## 2018-06-03 RX ORDER — IBUPROFEN 600 MG/1
600 TABLET ORAL
Status: COMPLETED | OUTPATIENT
Start: 2018-06-03 | End: 2018-06-03

## 2018-06-03 RX ORDER — CITALOPRAM 10 MG/1
10 TABLET ORAL DAILY
COMMUNITY
End: 2018-09-10

## 2018-06-03 RX ORDER — BENZONATATE 100 MG/1
100 CAPSULE ORAL
COMMUNITY
End: 2018-06-03

## 2018-06-03 RX ORDER — AZITHROMYCIN 250 MG/1
250 TABLET, FILM COATED ORAL DAILY
COMMUNITY
End: 2018-09-10

## 2018-06-03 RX ORDER — ALBUTEROL SULFATE 90 UG/1
2 AEROSOL, METERED RESPIRATORY (INHALATION)
Qty: 1 INHALER | Refills: 0 | Status: SHIPPED | OUTPATIENT
Start: 2018-06-03

## 2018-06-03 RX ADMIN — ALBUTEROL SULFATE 1 DOSE: 2.5 SOLUTION RESPIRATORY (INHALATION) at 18:13

## 2018-06-03 RX ADMIN — PREDNISONE 60 MG: 20 TABLET ORAL at 16:28

## 2018-06-03 RX ADMIN — IBUPROFEN 600 MG: 600 TABLET ORAL at 16:28

## 2018-06-03 RX ADMIN — ALBUTEROL SULFATE 1 DOSE: 2.5 SOLUTION RESPIRATORY (INHALATION) at 16:25

## 2018-06-03 RX ADMIN — ALBUTEROL SULFATE 1 DOSE: 2.5 SOLUTION RESPIRATORY (INHALATION) at 17:31

## 2018-06-03 NOTE — ED NOTES
Post neb peak flow was 410 as measured by MD on reassessment. The patient was given discharge instructions and questions were answered. Patient is in no apparent distress at time of discharge. Ambulatory with steady gait.

## 2018-06-03 NOTE — ED PROVIDER NOTES
HPI Comments: 28 y.o. male with past medical history significant for anxiety, gout, obesity, psoriasis, eczema, asthma, environmental allergies, HLD, HTN, who presents ambulatory to the ED with chief complaint of cough and congestion which have been progressively worsening in severity since Monday night, 5/28/2018. Pt reports that he felt the onset of head congestion, postnasal drainage, and sore throat around midnight Monday night/early Tuesday morning. He went to [facility] on Wednesday morning 4/30 and was told his symptoms were due to \"allergies\", and was instructed to take Flonase. He took the [place], along with other over the counter medications including Chloraseptic Spray and Throat Lozenges, Advil Cold & Sinus, and Tylenol Cold & Flu, but his symptoms did not improve. Pt states that he developed chest congestion on Thursday 5/31, and due to the progression of symptoms he went to [facility] yesterday morning. He reports that he was prescribed azithromycin, Tessalon Perles, viscous lidocaine, and an antihistamine cough syrup, and he has been taking those medications as prescribed. States that he has taken three doses of abx so far. Today, pt felt the onset of chills, subjective fevers, and intermittent diaphoresis. Denies measured fevers at home, but he was noted to have an elevated temperature in triage of 100.2F. Also reports that he has had generalized body aches today. Pt states that his cough is still present, which has been occasionally productive with sputum. Denies hemoptysis. Pt feels like his symptoms are not improving, and he notes that his sister-in-law was recently diagnosed with pneumonia. Pt notes that his wife wanted him to come to the ED today to rule out pna. Pt reports history of asthma that was diagnosed as an adult. Has taken prednisone in the past, but denies ever requiring hospitalization for an asthma exacerbation.  Has been using his albuterol inhaler for his symptoms with some relief, and his last does was this morning - it is  though. Pt also reports that he last had blood work performed two weeks ago at his PCP's office, and his hemoglobin A1c at that time was 5.1. Denies personal or family history of thromboembolus. Pt specifically denies any shortness of breath, chest pain, nausea, vomiting, diarrhea, chest tightness, and sinus pain. There are no other acute medical concerns at this time. Review of medical records indicates that patient's last office visit was 3/9/2018 with Dr. Chamorro [facility] for PVC's, hypertension, and hyperlipidemia. Social hx: Negative for Tobacco use (former smoker, still uses electronic cigarette); Positive for EtOH use (rare); Negative for Illicit Drug Abuse   PCP: Lois Martins MD  Cardiology: Dr. Chamorro [facility]     Note written by Kennedi Hester. Jenny Moreno, as dictated by Hiren Flanagan, DO 4:24 PM     The history is provided by the patient and medical records. No  was used. Past Medical History:   Diagnosis Date    Anxiety     Asthma     DJD (degenerative joint disease) of knee     DJD (degenerative joint disease) of lumbar spine     Essential hypertension     Gout     Hyperlipidemia     Morbid obesity ([address]     Psoriasis        Past Surgical History:   Procedure Laterality Date    HX ORTHOPAEDIC      arthroscopic bilateral knees    ORAL SURGERY PROCEDURE      SINUS SURGERY PROC UNLISTED           Family History:   Problem Relation Age of Onset   [facility] MS Mother     Hypertension Father     Cancer Father        Social History     Social History    Marital status: SINGLE     Spouse name: N/A    Number of children: N/A    Years of education: N/A     Occupational History    Not on file.      Social History Main Topics    Smoking status: Former Smoker    Smokeless tobacco: Never Used    Alcohol use Yes      Comment: rarely    Drug use: No    Sexual activity: Yes     Other Topics Concern    Not on file     Social History Narrative         ALLERGIES: Penicillin g    Review of Systems   Constitutional: Positive for chills, diaphoresis and fever (subjective). HENT: Positive for congestion, postnasal drip and sore throat. Negative for sinus pain and sinus pressure. Respiratory: Positive for cough. Negative for chest tightness and shortness of breath. Gastrointestinal: Negative for diarrhea, nausea and vomiting. All other systems reviewed and are negative. Vitals:    06/03/18 1559   BP: 139/77   Pulse: 94   Resp: 17   Temp: 100.2 °F (37.9 °C)   SpO2: 100%   Weight: (!) 168.9 kg (372 lb 5.7 oz)   Height: 6' 1\" (1.854 m)            Physical Exam     Constitutional: Pt is awake and alert. Pt appears well-developed. NAD. HENT:   Head: Normocephalic and atraumatic. No sinus tenderness. Ears: TM's are clear bilaterally. Nose: Nose normal.   Mouth/Throat: Oropharynx is clear and moist. No oropharyngeal exudate. Eyes: Conjunctivae and extraocular motions are normal. Pupils are equal, round, and reactive to light. Right eye exhibits no discharge. Left eye exhibits no discharge. No scleral icterus. Neck: No tracheal deviation present. Supple neck. Cardiovascular: Normal rate, regular rhythm, normal heart sounds and intact distal pulses. Exam reveals no gallop and no friction rub. No murmur heard. Pulmonary/Chest: Effort normal and breath sounds normal.  No respiratory distress. Pt  has no wheezes. Pt  has no rales. Lungs are clear to auscultation bilaterally. Coughs infrequently. Abdominal: Soft. Pt  exhibits no distension and no mass. No tenderness. Pt  has no rebound and no guarding. Musculoskeletal:  Pt  exhibits no edema and no tenderness. Ext: Normal ROM in all four extremities; not tender to palpation; distal pulses are normal, no edema. Neurological:  Pt is alert. nonfocal neuro exam.  Skin: Skin is warm and dry. Pt  is not diaphoretic.    Psychiatric:  Pt  has a normal mood and affect. Behavior is normal.     Note written by Luis Franks. [place], [address] [address], as dictated by Lorri Roberto DO 4:24 PM     [facility]      ED Course       Procedures    ED EKG interpretation:  Rhythm: normal sinus rhythm; and regular . Rate (approx.): 92 bpm; Axis: normal; MS Interval: normal; QRS interval: prolonged; ST/T wave: normal; Other findings: normal.   EKG documented by rocco Bethea, as interpreted by Lorri Roberto DO, ED MD, 4:32 PM       PROGRESS NOTE:  4:58 PM  CXR visualized and interpreted. Pre neb PEFR: 390  Predicted PEFR: 650  Post nebs: 410    Felt a lot better. PEFR mildly better. Likely viral  Sees PCP in 2 days anyway in the office scheduled  Stop tessalon, lidocaine, and cough syrup - it doesn't seem that they are helping. Continue everything else. Has an  albuterol MDI at home.             Doubt ACS, PE.

## 2018-06-03 NOTE — ED TRIAGE NOTES
Pt been eval at three different locations for chest / URI sx and sore throat. Was told to come back on Monday if sx aren't any better. Pt feels like he is worsening.

## 2018-06-03 NOTE — DISCHARGE INSTRUCTIONS
Asthma Attack: After Your Visit to the Emergency Room  Your Care Instructions  During an asthma attack, your airways swell and narrow. This makes it hard to breathe. Severe asthma attacks can be life-threatening, but you can help prevent them by keeping your asthma under control and treating symptoms before they get bad. Even though you have been released from the emergency room, you still need to watch for any problems. The doctor carefully checked you. But sometimes problems can develop later. If you have new symptoms, or if your symptoms do not get better, return to the emergency room or call your doctor right away. A visit to the emergency room is only one step in your treatment. Even if you feel better, you still need to do what your doctor recommends, such as going to all suggested follow-up appointments and taking medicines exactly as directed. This will help you recover and help prevent future problems. How can you care for yourself at home? · Learn how to use your inhaler correctly. If you were given a spacer to use with your inhaler, use it exactly as your doctor showed you. Spacers help asthma medicine work better. · Follow your asthma action plan to prevent attacks. If you do not have an asthma action plan, work with your doctor to build one. · Take your asthma medicine correctly. Talk to your doctor right away if you have any questions about what to take and how to take it. ¨ Use a quick-relief medicine like albuterol when you have symptoms of an attack. ¨ If your doctor prescribed corticosteroid pills to use during an attack, take them exactly as prescribed. It may take hours for the pills to work, but they may make the episode shorter and help you breathe better. ¨ Take your controller medicine every day. Controller medicine is usually an inhaled corticosteroid that helps prevent problems before they occur.  Do not use your controller medicine to try to treat an attack that has already started. It does not work fast enough to help. ¨ Keep your medicines with you at all times. · Learn how to use a peak flow meter to measure how open your airways are. This will help you know when your asthma is getting worse before it gets severe. Coughing, wheezing, and having trouble breathing mean that your asthma may be getting out of control. · See your doctor regularly. These visits will help you learn more about asthma and what you can do to control it. Your doctor will monitor your treatment to make sure the medicine is helping you. ¨ Keep track of your asthma attacks and your treatment. After you have had an attack, write down what triggered it, what helped end it, and any concerns you have about your asthma action plan. Take your diary when you see your doctor. You can then review your asthma action plan and decide if it is working. ¨ Take your peak flow meter with you when you visit your doctor. Together, you can review the way you use it. Also take your spacer if you have one. · Do not smoke. Avoid smoky places. · Learn what triggers an asthma attack for you, and avoid the triggers when you can. Common triggers are colds, smoke, air pollution, pollen, animal dander, cockroaches, stress, food additives, and cold air. · Talk to your doctor before you use other medicines. Some medicines, such as aspirin, can cause asthma attacks in some people. When should you call for help? Call 911 if:  · You have severe trouble breathing. Return to the emergency room now if:  · You cough up new yellow, dark brown, or bloody mucus. · Your coughing and wheezing get worse. Call your doctor today if:  · You are not steadily improving after your emergency room visit. · You need to use quick-relief medicine on more than 2 days a week (unless it is just for exercise). · You have any problems with your asthma medicine. · Your symptoms do not get better after you have followed your asthma action plan.   · You need help figuring out what is triggering your asthma attacks. Where can you learn more? Go to LearnSomething.be  Enter B821 in the search box to learn more about \"Asthma Attack: After Your Visit to the Emergency Room. \"   © 7282-9785 Healthwise, [employer]. Care instructions adapted under license by [employer] (which disclaims liability or warranty for this information). This care instruction is for use with your licensed healthcare professional. If you have questions about a medical condition or this instruction, always ask your healthcare professional. [address] any warranty or liability for your use of this information.   Content Version: 9.3.44836; Last Revised: February 4, 2011

## 2018-06-04 ENCOUNTER — OFFICE VISIT (OUTPATIENT)
Dept: SLEEP MEDICINE | Age: 33
End: 2018-06-04

## 2018-06-04 VITALS
DIASTOLIC BLOOD PRESSURE: 70 MMHG | WEIGHT: 315 LBS | HEIGHT: 72 IN | SYSTOLIC BLOOD PRESSURE: 140 MMHG | HEART RATE: 79 BPM | OXYGEN SATURATION: 97 % | BODY MASS INDEX: 42.66 KG/M2

## 2018-06-04 DIAGNOSIS — I10 ESSENTIAL HYPERTENSION, BENIGN: ICD-10-CM

## 2018-06-04 DIAGNOSIS — G47.33 OSA (OBSTRUCTIVE SLEEP APNEA): Primary | ICD-10-CM

## 2018-06-04 LAB
ATRIAL RATE: 92 BPM
CALCULATED P AXIS, ECG09: 50 DEGREES
CALCULATED R AXIS, ECG10: 39 DEGREES
CALCULATED T AXIS, ECG11: 31 DEGREES
DIAGNOSIS, 93000: NORMAL
P-R INTERVAL, ECG05: 164 MS
Q-T INTERVAL, ECG07: 360 MS
QRS DURATION, ECG06: 88 MS
QTC CALCULATION (BEZET), ECG08: 445 MS
VENTRICULAR RATE, ECG03: 92 BPM

## 2018-06-04 RX ORDER — LORAZEPAM 0.5 MG/1
0.5 TABLET ORAL
COMMUNITY
End: 2018-09-10

## 2018-06-04 RX ORDER — CITALOPRAM 10 MG/1
10 TABLET ORAL
COMMUNITY
Start: 2018-05-15 | End: 2019-05-16

## 2018-06-04 RX ORDER — FLUTICASONE PROPIONATE 50 MCG
2 SPRAY, SUSPENSION (ML) NASAL AS NEEDED
COMMUNITY
Start: 2018-04-26

## 2018-06-04 NOTE — PROGRESS NOTES
[address] [place] [place], [address]  Tel.  [phone]  Fax. [address] [facility] 60  [place], [address] [facility]  Tel.  [phone]  Fax. [phone] [address] [facility] [address]   Tel.  [phone]  Fax. [phone]     S>Rosendo Vivas is a 28 y.o. male seen for a positive airway pressure follow-up. He reports no problems using the device. He is 33% compliant over the past 30 days use has occurred on 80% of the night. The following problems are identified:    Drowsiness no Problems exhaling no   Snoring no Forget to put on no   Mask Comfortable yes Can't fall asleep no   Dry Mouth no Mask falls off no   Air Leaking no Frequent awakenings no       He admits that his sleep is improved when CPAP is used. 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 Mask size was changed from Medium to Large. Allergies   Allergen Reactions    Penicillin G Hives       He has a current medication list which includes the following prescription(s): fluticasone, citalopram, citalopram, azithromycin, prednisone, albuterol, inhalational spacing device, losartan, atenolol, allopurinol, omega-3 fatty acids-vitamin e, and lorazepam.. He  has a past medical history of Anxiety; Asthma; DJD (degenerative joint disease) of knee; DJD (degenerative joint disease) of lumbar spine; Essential hypertension; Gout; Hyperlipidemia; Morbid obesity ([address] and Psoriasis. He also has no past medical history of Diabetes ([facility] or Thyroid disease. [place] Sleepiness Score: 9   and Modified F.O.S.Q. Score Total / 2: 20   which reflect improved sleep quality over therapy time.     O>    Visit Vitals    /70    Pulse 79    Ht 6' (1.829 m)    Wt (!) 373 lb (169.2 kg)    SpO2 97%    BMI 50.59 kg/m2         General:   Not in acute distress   Eyes:  Anicteric sclerae, no obvious strabismus Nose:  No obvious nasal septum deviation    Oropharynx:   Class 3 oropharyngeal outlet, thick tongue base, uvula not seen due to low-lying soft palate, narrow tonsilo-pharyngeal pilars   Tonsils:   tonsils are not visualized due to low-lying soft palate   Neck:   midline trachea   Chest/Lungs:  Equal lung expansion, clear on auscultation    CVS:  Normal rate, regular rhythm; no JVD   Skin:  Warm to touch; no obvious rashes   Neuro:  No focal deficits ; no obvious tremor    Psych:  Normal affect,  normal countenance;           A>    ICD-10-CM ICD-9-CM    1. SURENDRA (obstructive sleep apnea) G47.33 327.23    2. Essential hypertension, benign I10 401.1    3. BMI 50.0-59.9, adult ([facility] Z68.43 V85.43      AHI = 12 (2018). On Respironics :  4 - 20  cmH2O. Compliant:      no    Therapeutic Response:  Positive    P>    * We have recommended a dedicated weight loss through appropriate diet and an exercise regiment as significant weight reduction has been shown to reduce severity of obstructive sleep apnea. * Follow-up Disposition:  Return in about 3 months (around 9/4/2018), or if symptoms worsen or fail to improve. * He was asked to contact our office for any problems regarding PAP therapy. * Counseling was provided regarding the importance of regular PAP use and on proper sleep hygiene and safe driving. * Re-enforced proper and regular cleaning for the device. Thank you for allowing us to participate in your patient's medical care. Sarah Nance MD, FAASM  Electronically signed.  06/04/18

## 2018-06-04 NOTE — ED NOTES
Called pt  He feels better today  Intermittently wheezes  Still coughing but improved  Has appt with PCP tomorrow

## 2018-06-04 NOTE — PATIENT INSTRUCTIONS
[address] [facility] [place] [place], [address]  Tel.  [phone]  Fax. 100 [facility] [address]  [place], [address] [facility]  Tel.  [phone]  Fax. [phone] [address]  Tel.  [phone]  Fax. [phone]     Learning About CPAP for Sleep Apnea  What is CPAP? CPAP is a small machine that you use at home every night while you sleep. It increases air pressure in your throat to keep your airway open. When you have sleep apnea, this can help you sleep better so you feel much better. CPAP stands for \"continuous positive airway pressure. \"  The CPAP machine will have one of the following:  · A mask that covers your nose and mouth  · Prongs that fit into your nose  · A mask that covers your nose only, the most common type. This type is called NCPAP. The N stands for \"nasal.\"  Why is it done? CPAP is usually the best treatment for obstructive sleep apnea. It is the first treatment choice and the most widely used. Your doctor may suggest CPAP if you have:  · Moderate to severe sleep apnea. · Sleep apnea and coronary artery disease (CAD) or heart failure. How does it help? · CPAP can help you have more normal sleep, so you feel less sleepy and more alert during the daytime. · CPAP may help keep heart failure or other heart problems from getting worse. · NCPAP may help lower your blood pressure. · If you use CPAP, your bed partner may also sleep better because you are not snoring or restless. What are the side effects? Some people who use CPAP have:  · A dry or stuffy nose and a sore throat. · Irritated skin on the face. · Sore eyes. · Bloating. If you have any of these problems, work with your doctor to fix them. Here are some things you can try:  · Be sure the mask or nasal prongs fit well. · See if your doctor can adjust the pressure of your CPAP. · If your nose is dry, try a humidifier.   · If your nose is runny or stuffy, try decongestant medicine or a steroid nasal spray. If these things do not help, you might try a different type of machine. Some machines have air pressure that adjusts on its own. Others have air pressures that are different when you breathe in than when you breathe out. This may reduce discomfort caused by too much pressure in your nose. Where can you learn more? Go to [employer]  Enter Arun Counts in the search box to learn more about \"Learning About CPAP for Sleep Apnea. \"   © 9065-7790 Healthwise, Incorporated. Care instructions adapted under license by Rosangela Khanna (which disclaims liability or warranty for this information). This care instruction is for use with your licensed healthcare professional. If you have questions about a medical condition or this instruction, always ask your healthcare professional. [address] any warranty or liability for your use of this information. Content Version: 2.8.20532; Last Revised: January 11, 2010  PROPER SLEEP HYGIENE    What to avoid  · Do not have drinks with caffeine, such as coffee or black tea, for 8 hours before bed. · Do not smoke or use other types of tobacco near bedtime. Nicotine is a stimulant and can keep you awake. · Avoid drinking alcohol late in the evening, because it can cause you to wake in the middle of the night. · Do not eat a big meal close to bedtime. If you are hungry, eat a light snack. · Do not drink a lot of water close to bedtime, because the need to urinate may wake you up during the night. · Do not read or watch TV in bed. Use the bed only for sleeping and sexual activity. What to try  · Go to bed at the same time every night, and wake up at the same time every morning. Do not take naps during the day. · Keep your bedroom quiet, dark, and cool. · Get regular exercise, but not within 3 to 4 hours of your bedtime. .  · Sleep on a comfortable pillow and mattress.   · If watching the clock makes you anxious, turn it facing away from you so you cannot see the time. · If you worry when you lie down, start a worry book. Well before bedtime, write down your worries, and then set the book and your concerns aside. · Try meditation or other relaxation techniques before you go to bed. · If you cannot fall asleep, get up and go to another room until you feel sleepy. Do something relaxing. Repeat your bedtime routine before you go to bed again. · Make your house quiet and calm about an hour before bedtime. Turn down the lights, turn off the TV, log off the computer, and turn down the volume on music. This can help you relax after a busy day. Drowsy Driving: The [employer] cites drowsiness as a causing factor in more than 041,343 police reported crashes annually, resulting in 76,000 injuries and 1,500 deaths. Other surveys suggest 55% of people polled have driven while drowsy in the past year, 23% had fallen asleep but not crashed, 3% crashed, and 2% had and accident due to drowsy driving. Who is at risk? Young Drivers: One study of drowsy driving accidents states that 55% of the drivers were under 25 years. Of those, 75% were male. Shift Workers and Travelers: People who work overnight or travel across time zones frequently are at higher risk of experiencing Circadian Rhythm Disorders. They are trying to work and function when their body is programed to sleep. Sleep Deprived: Lack of sleep has a serious impact on your ability to pay attention or focus on a task. Consistently getting less than the average of 8 hours your body needs creates partial or cumulative sleep deprivation. Untreated Sleep Disorders: Sleep Apnea, Narcolepsy, R.L.S., and other sleep disorders (untreated) prevent a person from getting enough restful sleep. This leads to excessive daytime sleepiness and increases the risk for drowsy driving accidents by up to 7 times.   Medications / Alcohol: Even over the counter medications can cause drowsiness. Medications that impair a drivers attention should have a warning label. Alcohol naturally makes you sleepy and on its own can cause accidents. Combined with excessive drowsiness its effects are amplified. Signs of Drowsy Driving:   * You don't remember driving the last few miles   * You may drift out of your salvatore   * You are unable to focus and your thoughts wander   * You may yawn more often than normal   * You have difficulty keeping your eyes open / nodding off   * Missing traffic signs, speeding, or tailgating  Prevention-   Good sleep hygiene, lifestyle and behavioral choices have the most impact on drowsy driving. There is no substitute for sleep and the average person requires 8 hours nightly. If you find yourself driving drowsy, stop and sleep. Consider the sleep hygiene tips provided during your visit as well. Medication Refill Policy: Refills for all medications require 1 week advance notice. Please have your pharmacy fax a refill request. We are unable to fax, or call in \"controled substance\" medications and you will need to pick these prescriptions up from our office. [employer] Activation    Thank you for requesting access to [employer]. Please follow the instructions below to securely access and download your online medical record. [employer] allows you to send messages to your doctor, view your test results, renew your prescriptions, schedule appointments, and more. How Do I Sign Up? 1. In your internet browser, go to [email or web address] 2. Click on the First Time User? Click Here link in the Sign In box. You will see the New Member Sign Up page. 3. Enter your [employer] Access Code exactly as it appears below. You will not need to use this code after youve completed the sign-up process. If you do not sign up before the expiration date, you must request a new code. [employer] Access Code:  Activation code not generated  Current Loopcam Status: Active (This is the date your Loopcam access code will )    4. Enter the last four digits of your Social Security Number (xxxx) and Date of Birth (mm/dd/yyyy) as indicated and click Submit. You will be taken to the next sign-up page. 5. Create a [employer] ID. This will be your Loopcam login ID and cannot be changed, so think of one that is secure and easy to remember. 6. Create a Loop[employer] password. You can change your password at any time. 7. Enter your Password Reset Question and Answer. This can be used at a later time if you forget your password. 8. Enter your e-mail address. You will receive e-mail notification when new information is available in [address] 9. Click Sign Up. You can now view and download portions of your medical record. 10. Click the Download Summary menu link to download a portable copy of your medical information. Additional Information    If you have questions, please call [phone]. Remember, Loop[employer] is NOT to be used for urgent needs. For medical emergencies, dial 911.

## 2018-06-04 NOTE — MR AVS SNAPSHOT
[address] [facility] 
 
 
 [address] [facility] [address] [address] [phone] Patient: Susan Asher MRN: [id number] XAM:3[birth year]81 Visit Information Date & Time Provider Department Dept. Phone Encounter #  
 6/4/2018  2:40 PM Dereck Guaman MD [facility] 53 [id number] Follow-up Instructions Return in about 3 months (around 9/4/2018), or if symptoms worsen or fail to improve. Follow-up and Disposition History Your Appointments 9/10/2018  9:40 AM  
Any with Dereck Guaman MD  
[address] [facility] ([facility] Appt Note: 3 month f/up [address] [place], [place] [address]Che 3 [facility] [id number] 9191 [facility] [address] 9/10/2018  4:20 PM  
ESTABLISHED PATIENT with Asif Mñuoz MD  
[facility] ([facility]) Appt Note: 6 mo fup  
 [address] [facility] [address] [address] [address] Upcoming Health Maintenance Date Due Pneumococcal 19-64 Medium Risk (1 of 1 - PPSV23) 1/4/2003 DTaP/Tdap/Td series (1 - Tdap) 1/4/2005 Influenza Age 5 to Adult 8/1/2018 Allergies as of 6/4/2018  Review Complete On: 6/4/2018 By: Dereck Guaman MD  
  
 Severity Noted Reaction Type Reactions Penicillin G High 01/12/2012    Hives Current Immunizations  Reviewed on 8/4/2017 Name Date Rabies Immune Globulin 7/21/2017 12:47 PM  
 Rabies- IM Fibroblast Culture 8/4/2017  5:22 PM, 7/28/2017  5:44 PM,  Incomplete, 7/24/2017  4:53 PM, 7/21/2017 12:45 PM  
  
 Not reviewed this visit You Were Diagnosed With   
  
 Codes Comments SURENDRA (obstructive sleep apnea)    -  Primary ICD-10-CM: G47.33 
ICD-9-CM: 327.23 Essential hypertension, benign     ICD-10-CM: I10 
ICD-9-CM: 401.1 BMI 50.0-59.9, adult [facility])     ICD-10-CM: F54.93 
ICD-9-CM: V85.43 Vitals BP Pulse Height(growth percentile) Weight(growth percentile) SpO2 BMI  
 140/70 79 6' (1.829 m) (!) 373 lb (169.2 kg) 97% 50.59 kg/m2 Smoking Status Former Smoker Vitals History BMI and BSA Data Body Mass Index Body Surface Area 50.59 kg/m 2 2.93 m 2 Preferred Pharmacy Pharmacy Name Phone [id number] [place], [address] AT [facility] [phone] Your Updated Medication List  
  
   
This list is accurate as of 6/4/18  3:07 PM.  Always use your most recent med list.  
  
  
  
  
 albuterol 90 mcg/actuation inhaler Commonly known as:  PROVENTIL HFA Take 2 Puffs by inhalation every four (4) hours as needed for Wheezing. allopurinol 300 mg tablet Commonly known as:  Viola Harries Take  by mouth daily. atenolol 50 mg tablet Commonly known as:  TENORMIN  
TAKE 1 TABLET BY MOUTH DAILY. azithromycin 250 mg tablet Commonly known as:  Roslyn Gaming Take 250 mg by mouth daily. * citalopram 10 mg tablet Commonly known as:  Jh Copa Take 10 mg by mouth daily. * citalopram 10 mg tablet Commonly known as:  Jh Copa Take 10 mg by mouth. FISH OIL 1,000 mg Cap Generic drug:  omega-3 fatty acids-vitamin e Take 1 Cap by mouth two (2) times a day. fluticasone 50 mcg/actuation nasal spray Commonly known as:  FLONASE  
  
 inhalational spacing device 1 Each by Does Not Apply route as needed. LORazepam 0.5 mg tablet Commonly known as:  ATIVAN Take 0.5 mg by mouth.  
  
 losartan 100 mg tablet Commonly known as:  COZAAR  
TAKE 1 TABLET BY MOUTH DAILY. predniSONE 50 mg tablet Commonly known as:  Dalbert Sosa Take 1 Tab by mouth daily for 4 days. * Notice:   This list has 2 medication(s) that are the same as other medications prescribed for you. Read the directions carefully, and ask your doctor or other care provider to review them with you. Follow-up Instructions Return in about 3 months (around 9/4/2018), or if symptoms worsen or fail to improve. Patient Instructions 217 [facility]., [address]. 1668 Jeffery Chen, [address] Tel.  [phone] Fax. 100 [facility] 60 [place], 200 [facility] Tel.  [phone] Fax. [phone] 9250 [facility] Esther Zelaya  Tel.  [phone] Fax. [phone] Learning About CPAP for Sleep Apnea What is CPAP? CPAP is a small machine that you use at home every night while you sleep. It increases air pressure in your throat to keep your airway open. When you have sleep apnea, this can help you sleep better so you feel much better. CPAP stands for \"continuous positive airway pressure. \" The CPAP machine will have one of the following: · A mask that covers your nose and mouth · Prongs that fit into your nose · A mask that covers your nose only, the most common type. This type is called NCPAP. The N stands for \"nasal.\" Why is it done? CPAP is usually the best treatment for obstructive sleep apnea. It is the first treatment choice and the most widely used. Your doctor may suggest CPAP if you have: · Moderate to severe sleep apnea. · Sleep apnea and coronary artery disease (CAD) or heart failure. How does it help? · CPAP can help you have more normal sleep, so you feel less sleepy and more alert during the daytime. · CPAP may help keep heart failure or other heart problems from getting worse. · NCPAP may help lower your blood pressure. · If you use CPAP, your bed partner may also sleep better because you are not snoring or restless. What are the side effects? Some people who use CPAP have: · A dry or stuffy nose and a sore throat. · Irritated skin on the face. · Sore eyes. · Bloating. If you have any of these problems, work with your doctor to fix them. Here are some things you can try: · Be sure the mask or nasal prongs fit well. · See if your doctor can adjust the pressure of your CPAP. · If your nose is dry, try a humidifier. · If your nose is runny or stuffy, try decongestant medicine or a steroid nasal spray. If these things do not help, you might try a different type of machine. Some machines have air pressure that adjusts on its own. Others have air pressures that are different when you breathe in than when you breathe out. This may reduce discomfort caused by too much pressure in your nose. Where can you learn more? Go to [employer].be Enter C189 in the search box to learn more about \"Learning About CPAP for Sleep Apnea. \"  
© 2749-8655 Healthwise, Incorporated. Care instructions adapted under license by [employer] (which disclaims liability or warranty for this information). This care instruction is for use with your licensed healthcare professional. If you have questions about a medical condition or this instruction, always ask your healthcare professional. [place] [address] any warranty or liability for your use of this information. Content Version: 5.5.34339; Last Revised: January 11, 2010 PROPER SLEEP HYGIENE What to avoid · Do not have drinks with caffeine, such as coffee or black tea, for 8 hours before bed. · Do not smoke or use other types of tobacco near bedtime. Nicotine is a stimulant and can keep you awake. · Avoid drinking alcohol late in the evening, because it can cause you to wake in the middle of the night. · Do not eat a big meal close to bedtime. If you are hungry, eat a light snack. · Do not drink a lot of water close to bedtime, because the need to urinate may wake you up during the night. · Do not read or watch TV in bed. Use the bed only for sleeping and sexual activity. What to try · Go to bed at the same time every night, and wake up at the same time every morning. Do not take naps during the day. · Keep your bedroom quiet, dark, and cool. · Get regular exercise, but not within 3 to 4 hours of your bedtime. Carito Loose · Sleep on a comfortable pillow and mattress. · If watching the clock makes you anxious, turn it facing away from you so you cannot see the time. · If you worry when you lie down, start a worry book. Well before bedtime, write down your worries, and then set the book and your concerns aside. · Try meditation or other relaxation techniques before you go to bed. · If you cannot fall asleep, get up and go to another room until you feel sleepy. Do something relaxing. Repeat your bedtime routine before you go to bed again. · Make your house quiet and calm about an hour before bedtime. Turn down the lights, turn off the TV, log off the computer, and turn down the volume on music. This can help you relax after a busy day. Drowsy Driving: The [employer] cites drowsiness as a causing factor in more than 958,046 police reported crashes annually, resulting in 76,000 injuries and 1,500 deaths. Other surveys suggest 55% of people polled have driven while drowsy in the past year, 23% had fallen asleep but not crashed, 3% crashed, and 2% had and accident due to drowsy driving. Who is at risk? Young Drivers: One study of drowsy driving accidents states that 55% of the drivers were under 25 years. Of those, 75% were male. Shift Workers and Travelers: People who work overnight or travel across time zones frequently are at higher risk of experiencing Circadian Rhythm Disorders. They are trying to work and function when their body is programed to sleep. Sleep Deprived: Lack of sleep has a serious impact on your ability to pay attention or focus on a task.  Consistently getting less than the average of 8 hours your body needs creates partial or cumulative sleep deprivation. Untreated Sleep Disorders: Sleep Apnea, Narcolepsy, R.L.S., and other sleep disorders (untreated) prevent a person from getting enough restful sleep. This leads to excessive daytime sleepiness and increases the risk for drowsy driving accidents by up to 7 times. Medications / Alcohol: Even over the counter medications can cause drowsiness. Medications that impair a drivers attention should have a warning label. Alcohol naturally makes you sleepy and on its own can cause accidents. Combined with excessive drowsiness its effects are amplified. Signs of Drowsy Driving: * You don't remember driving the last few miles * You may drift out of your salvatore * You are unable to focus and your thoughts wander * You may yawn more often than normal 
 * You have difficulty keeping your eyes open / nodding off * Missing traffic signs, speeding, or tailgating Prevention-  
Good sleep hygiene, lifestyle and behavioral choices have the most impact on drowsy driving. There is no substitute for sleep and the average person requires 8 hours nightly. If you find yourself driving drowsy, stop and sleep. Consider the sleep hygiene tips provided during your visit as well. Medication Refill Policy: Refills for all medications require 1 week advance notice. Please have your pharmacy fax a refill request. We are unable to fax, or call in \"controled substance\" medications and you will need to pick these prescriptions up from our office. [employer] Activation Thank you for requesting access to [employer]. Please follow the instructions below to securely access and download your online medical record. [employer] allows you to send messages to your doctor, view your test results, renew your prescriptions, schedule appointments, and more. How Do I Sign Up? 1. In your internet browser, go to [email or web address]. 2. Click on the First Time User? Click Here link in the Sign In box. You will see the New Member Sign Up page. 3. Enter your EventRadar Access Code exactly as it appears below. You will not need to use this code after youve completed the sign-up process. If you do not sign up before the expiration date, you must request a new code. MyChart Access Code: Activation code not generated Current EventRadar Status: Active (This is the date your Practice Fusiont access code will ) 4. Enter the last four digits of your Social Security Number (xxxx) and Date of Birth (mm/dd/yyyy) as indicated and click Submit. You will be taken to the next sign-up page. 5. Create a Practice Fusiont ID. This will be your EventRadar login ID and cannot be changed, so think of one that is secure and easy to remember. 6. Create a EventRadar password. You can change your password at any time. 7. Enter your Password Reset Question and Answer. This can be used at a later time if you forget your password. 8. Enter your e-mail address. You will receive e-mail notification when new information is available in [address] 9. Click Sign Up. You can now view and download portions of your medical record. 10. Click the Download Summary menu link to download a portable copy of your medical information. Additional Information If you have questions, please call [phone]. Remember, EventRadar is NOT to be used for urgent needs. For medical emergencies, dial 911. Introducing [facility]! Dear Leatha Brady: 
Thank you for requesting a EventRadar account. Our records indicate that you already have an active EventRadar account. You can access your account anytime at [email or web address] [employer] Bus/[employer] Did you know that you can access your hospital and ER discharge instructions at any time in EventRadar? You can also review all of your test results from your hospital stay or ER visit. Additional Information If you have questions, please visit the Frequently Asked Questions section of the Lastlinet website at [email or web address] Remember, ipsy is NOT to be used for urgent needs. For medical emergencies, dial 911. Now available from your iPhone and Android! Please provide this summary of care documentation to your next provider. Your primary care clinician is listed as Lizandro Hiss. If you have any questions after today's visit, please call [phone].

## 2018-06-08 RX ORDER — LOSARTAN POTASSIUM 100 MG/1
TABLET ORAL
Qty: 90 TAB | Refills: 0 | Status: SHIPPED | OUTPATIENT
Start: 2018-06-08 | End: 2018-09-06 | Stop reason: SDUPTHER

## 2018-06-08 RX ORDER — ATENOLOL 50 MG/1
TABLET ORAL
Qty: 90 TAB | Refills: 0 | Status: SHIPPED | OUTPATIENT
Start: 2018-06-08 | End: 2018-09-06 | Stop reason: SDUPTHER

## 2018-06-08 NOTE — TELEPHONE ENCOUNTER
Refills are per verbal order of Dr. Efrain Kathleen. Requested Prescriptions     Pending Prescriptions Disp Refills    atenolol (TENORMIN) 50 mg tablet [Pharmacy Med Name: ATENOLOL 50 MG TABLET] 90 Tab 0     Sig: TAKE 1 TABLET BY MOUTH DAILY.  losartan (COZAAR) 100 mg tablet [Pharmacy Med Name: LOSARTAN POTASSIUM 100 MG TAB] 90 Tab 0     Sig: TAKE 1 TABLET BY MOUTH DAILY.

## 2018-09-06 RX ORDER — LOSARTAN POTASSIUM 100 MG/1
TABLET ORAL
Qty: 90 TAB | Refills: 0 | Status: SHIPPED | OUTPATIENT
Start: 2018-09-06 | End: 2018-12-29 | Stop reason: SDUPTHER

## 2018-09-06 RX ORDER — ATENOLOL 50 MG/1
TABLET ORAL
Qty: 90 TAB | Refills: 0 | Status: SHIPPED | OUTPATIENT
Start: 2018-09-06 | End: 2018-12-05 | Stop reason: SDUPTHER

## 2018-09-06 NOTE — TELEPHONE ENCOUNTER
Last OV 3/9/18  Next OV 9/10/18    Refill per VO Dr. Navi Maher. Requested Prescriptions     Pending Prescriptions Disp Refills    losartan (COZAAR) 100 mg tablet [Pharmacy Med Name: LOSARTAN POTASSIUM 100 MG TAB] 90 Tab 0     Sig: TAKE 1 TABLET BY MOUTH DAILY.  atenolol (TENORMIN) 50 mg tablet [Pharmacy Med Name: ATENOLOL 50 MG TABLET] 90 Tab 0     Sig: TAKE 1 TABLET BY MOUTH DAILY.

## 2018-09-10 ENCOUNTER — OFFICE VISIT (OUTPATIENT)
Dept: CARDIOLOGY CLINIC | Age: 33
End: 2018-09-10

## 2018-09-10 VITALS
RESPIRATION RATE: 18 BRPM | SYSTOLIC BLOOD PRESSURE: 126 MMHG | OXYGEN SATURATION: 97 % | HEART RATE: 80 BPM | HEIGHT: 72 IN | WEIGHT: 315 LBS | BODY MASS INDEX: 42.66 KG/M2 | DIASTOLIC BLOOD PRESSURE: 74 MMHG

## 2018-09-10 DIAGNOSIS — Z01.810 PRE-OPERATIVE CARDIOVASCULAR EXAMINATION: Primary | ICD-10-CM

## 2018-09-10 DIAGNOSIS — Z87.898 HISTORY OF PALPITATIONS: ICD-10-CM

## 2018-09-10 DIAGNOSIS — I10 ESSENTIAL HYPERTENSION, BENIGN: ICD-10-CM

## 2018-09-10 DIAGNOSIS — E66.01 MORBID OBESITY (HCC): ICD-10-CM

## 2018-09-10 NOTE — PROGRESS NOTES
Haylie Martini MD    [address] [facility], [address] [facility]    Office [phone],[facility] [phone]  Pager [phone]    Galilea Brock is a 28 y.o. male is here for f/u visit . Primary care physician:  Elfego Shi MD    Patient Active Problem List   Diagnosis Code    Essential hypertension, benign I10    PVC's (premature ventricular contractions) I49.3    Other and unspecified hyperlipidemia E78.5    Morbid obesity ([address] E66.01       Chief complaint:  Chief Complaint   Patient presents with    Follow-up     HTN, PVCs, cholesterol     Dear Dr. Salvatore Altamirano,    I had the pleasure of seeing Mr. Barbie Chau in the office today. Assessment:    HTN  Palpitations - hx of PVC - occurs more often when he is anxious  ED visit - 5/14/15 - palpitations. Morbid obesity  Preoperative cardiovascular evaluation prior to his surgery    Plan:     Low risk from a cardiac standpoint for nonvascular surgery. Can proceed with surgery. Stress echocardiogram - nml 11/22/17  Counselled about wt loss/low eula diet/exercise -  Lipids per PCP  F/u in 12 mths/prn    I appreciate the opportunity to be involved in Mr. Guerrier. See note below for details. Please do not hesitate to contact us with questions or concerns. Haylie Martini MD    Cardiac Testing/ Procedures: A. Cardiac Cath/PCI:    B.ECHO/SAMPSON:    C.StressNuclear/Stress ECHO/Stress test: Stress echocardiogram November 2017-7 min 31 sec/normal    Stress echo 2/2014 - 8 min;Nml; NmlEF    D. Vascular:    E. EP: 7/30/15 - SR/no PAC/1 PVC; Diary entries correlate with SR    F. Miscellaneous:    Subjective:  Galilea Brock is a 28 y.o. male who returns for follow up visit    Patient states that he does not have any chest pain or dyspnea. However has trouble losing weight. He has gained weight since last visit. Also trying to quit smoking. Scheduled for wrist surgery.       ROS:  (bold if positive, if negative) Medications before admission:    Current Outpatient Prescriptions   Medication Sig Dispense    losartan (COZAAR) 100 mg tablet TAKE 1 TABLET BY MOUTH DAILY. 90 Tab    atenolol (TENORMIN) 50 mg tablet TAKE 1 TABLET BY MOUTH DAILY. 90 Tab    fluticasone (FLONASE) 50 mcg/actuation nasal spray 2 Sprays by Both Nostrils route as needed.  citalopram (CELEXA) 10 mg tablet Take 10 mg by mouth.  albuterol (PROVENTIL HFA) 90 mcg/actuation inhaler Take 2 Puffs by inhalation every four (4) hours as needed for Wheezing. 1 Inhaler    inhalational spacing device 1 Each by Does Not Apply route as needed. 1 Device    allopurinol (ZYLOPRIM) 300 mg tablet Take  by mouth daily. No current facility-administered medications for this visit. Physical Exam:  Visit Vitals    /74 (BP 1 Location: Left arm)    Resp 18    Ht 6' (1.829 m)    Wt (!) 383 lb (173.7 kg)    SpO2 97%    BMI 51.94 kg/m2          Gen: Well-developed, well-nourished, in no acute distress, obese  Neck: Supple,No JVD, No Carotid Bruit,   Resp: No accessory muscle use, Clear breath sounds, No rales or rhonchi  Card: Regular Rate,Rythm,Normal S1, S2, No murmurs, rubs or gallop. No thrills.    Abd:  Soft, non-tender, non-distended,BS+,   MSK: No cyanosis  Skin: No rashes    Neuro: moving all four extremities , follows commands appropriately  Psych:  Good insight, oriented to person, place , alert, Nml Affect  LE: No edema    EKG: Sinus rhythm, normal axis, normal intervals      LABS:        Lab Results   Component Value Date/Time    WBC 10.7 05/24/2015 06:30 PM    HGB 14.1 05/24/2015 06:30 PM    HCT 42.4 05/24/2015 06:30 PM    PLATELET 765 27/29/0515 06:30 PM    MCV 88.1 05/24/2015 06:30 PM     Lab Results   Component Value Date/Time    Sodium 138 05/24/2015 06:30 PM    Potassium 4.2 05/24/2015 06:30 PM    Chloride 101 05/24/2015 06:30 PM    CO2 29 05/24/2015 06:30 PM    Anion gap 8 05/24/2015 06:30 PM    Glucose 108 (H) 05/24/2015 06:30 PM    BUN 14 05/24/2015 06:30 PM    Creatinine 0.88 05/24/2015 06:30 PM    BUN/Creatinine ratio 16 05/24/2015 06:30 PM    GFR est AA >60 05/24/2015 06:30 PM    GFR est non-AA >60 05/24/2015 06:30 PM    Calcium 9.2 05/24/2015 06:30 PM       No results found for: APTT  No results found for: INR  No components found for: Delmer Boggs MD

## 2018-12-05 RX ORDER — ATENOLOL 50 MG/1
TABLET ORAL
Qty: 90 TAB | Refills: 0 | Status: SHIPPED | OUTPATIENT
Start: 2018-12-05 | End: 2019-03-13 | Stop reason: SDUPTHER

## 2018-12-31 RX ORDER — LOSARTAN POTASSIUM 100 MG/1
TABLET ORAL
Qty: 90 TAB | Refills: 0 | Status: SHIPPED | OUTPATIENT
Start: 2018-12-31 | End: 2019-04-04 | Stop reason: SDUPTHER

## 2019-03-13 RX ORDER — ATENOLOL 50 MG/1
TABLET ORAL
Qty: 90 TAB | Refills: 1 | Status: SHIPPED | OUTPATIENT
Start: 2019-03-13

## 2019-03-13 NOTE — TELEPHONE ENCOUNTER
Last OV 9/10/18    Medication per verbal order Dr. Hudson Sahu. Requested Prescriptions     Pending Prescriptions Disp Refills    atenolol (TENORMIN) 50 mg tablet [Pharmacy Med Name: ATENOLOL 50 MG TABLET] 90 Tab 1     Sig: TAKE 1 TABLET BY MOUTH DAILY.

## 2019-03-29 NOTE — PROGRESS NOTES
Jared Ragland MD    [address], [address] [facility]    Office [phone],V [phone]  Pager [phone]    Ismael Al is a 34 y.o. male is here for f/u visit . Primary care physician:  Katrina Mejía MD    Patient Active Problem List   Diagnosis Code    Essential hypertension, benign I10    PVC's (premature ventricular contractions) I49.3    Other and unspecified hyperlipidemia E78.5    Morbid obesity ([facility] [address] E66.01       Chief complaint:  Chief Complaint   Patient presents with    Shortness of Breath    Hypertension       Assessment:    Dyspnea/chest pain  HTN  Palpitations - hx of PVC - occurs more often when he is anxious  ED visit - 5/14/15 - palpitations. Morbid obesity      Plan:     Stress echocardiogram.  Counselled about wt loss/low eula diet/exercise -  Lipids per PCP  Patient has an appointment in December. Follow-up also as needed. I appreciate the opportunity to be involved in Mr. Guerrier. See note below for details. Please do not hesitate to contact us with questions or concerns. Jared Ragland MD    Cardiac Testing/ Procedures: A. Cardiac Cath/PCI:    B.ECHO/SAMPSON:    C.StressNuclear/Stress ECHO/Stress test: Stress echo 2/2014 - 8 min;Nml; NmlEF    D. Vascular:    E. EP: 7/30/15 - SR/no PAC/1 PVC; Diary entries correlate with SR    F. Miscellaneous:    Subjective:  Ismael Al is a 34 y.o. male who returns for follow up visit    Patient stated that approximately a week ago he was playing golf he started having chest pain/dyspnea. Initially he attributed it to muscular etiology because he had not played golf for a while. However continue to persist.  He went to see his primary care physician yesterday. Reviewed note. Was started on Seroquel for anxiety/Protonix for GERD and was advised to follow with cardiology. No chest pain now. Has trouble losing weight. Wants to quit smoking.   Has not smoked for the RN spoke with patient. Pt is now retired and on a limited income. He stopped Repatha due to cost. RN discussed the [email or web address] application and qualification for approval. RN will send pt an application and provide samples.    past 24 hours. ROS:  (bold if positive, if negative)             Medications before admission:    Current Outpatient Prescriptions   Medication Sig Dispense    pantoprazole (PROTONIX) 40 mg tablet Take 40 mg by mouth daily.  QUEtiapine (SEROQUEL) 50 mg tablet Take 50 mg by mouth nightly.  losartan (COZAAR) 100 mg tablet TAKE 1 TABLET BY MOUTH DAILY. 90 Tab    atenolol (TENORMIN) 50 mg tablet TAKE 1 TABLET BY MOUTH DAILY. 90 Tab    allopurinol (ZYLOPRIM) 300 mg tablet Take  by mouth daily.  omega-3 fatty acids-vitamin e (FISH OIL) 1,000 mg cap Take 1 Cap by mouth two (2) times a day. No current facility-administered medications for this visit. Physical Exam:  Visit Vitals    /80 (BP 1 Location: Left arm)    Pulse 68    Ht 6' 1\" (1.854 m)    Wt 348 lb 9.6 oz (158.1 kg)    SpO2 98%    BMI 45.99 kg/m2          Gen: Well-developed, well-nourished, in no acute distress, obese  Neck: Supple,No JVD, No Carotid Bruit,   Resp: No accessory muscle use, Clear breath sounds, No rales or rhonchi  Card: Regular Rate,Rythm,Normal S1, S2, No murmurs, rubs or gallop. No thrills.    Abd:  Soft, non-tender, non-distended,BS+,   MSK: No cyanosis  Skin: No rashes    Neuro: moving all four extremities , follows commands appropriately  Psych:  Good insight, oriented to person, place , alert, Nml Affect  LE: No edema    EKG: Reviewed EKG 10/25/17-done at primary care physician's office ;sinus rhythm, normal axis, normal intervals      LABS:        Lab Results   Component Value Date/Time    WBC 10.7 05/24/2015 06:30 PM    HGB 14.1 05/24/2015 06:30 PM    HCT 42.4 05/24/2015 06:30 PM    PLATELET 368 69/85/8419 06:30 PM    MCV 88.1 05/24/2015 06:30 PM     Lab Results   Component Value Date/Time    Sodium 138 05/24/2015 06:30 PM    Potassium 4.2 05/24/2015 06:30 PM    Chloride 101 05/24/2015 06:30 PM    CO2 29 05/24/2015 06:30 PM    Anion gap 8 05/24/2015 06:30 PM    Glucose 108 05/24/2015 06:30 PM BUN 14 05/24/2015 06:30 PM    Creatinine 0.88 05/24/2015 06:30 PM    BUN/Creatinine ratio 16 05/24/2015 06:30 PM    GFR est AA >60 05/24/2015 06:30 PM    GFR est non-AA >60 05/24/2015 06:30 PM    Calcium 9.2 05/24/2015 06:30 PM       No results found for: APTT  No results found for: INR  No components found for: Alexandro Hutson MD

## 2019-04-05 RX ORDER — LOSARTAN POTASSIUM 100 MG/1
TABLET ORAL
Qty: 90 TAB | Refills: 0 | Status: SHIPPED | OUTPATIENT
Start: 2019-04-05

## 2019-05-09 ENCOUNTER — HOSPITAL ENCOUNTER (OUTPATIENT)
Dept: GENERAL RADIOLOGY | Age: 34
Discharge: HOME | End: 2019-05-09
Payer: COMMERCIAL

## 2019-05-09 DIAGNOSIS — J40 BRONCHITIS: ICD-10-CM

## 2019-05-09 PROCEDURE — 71046 X-RAY EXAM CHEST 2 VIEWS: CPT

## 2020-02-28 ENCOUNTER — OFFICE VISIT (OUTPATIENT)
Dept: CARDIOLOGY CLINIC | Age: 35
End: 2020-02-28

## 2020-02-28 VITALS
HEIGHT: 72 IN | SYSTOLIC BLOOD PRESSURE: 130 MMHG | OXYGEN SATURATION: 94 % | HEART RATE: 82 BPM | DIASTOLIC BLOOD PRESSURE: 78 MMHG | BODY MASS INDEX: 42.66 KG/M2 | WEIGHT: 315 LBS

## 2020-02-28 DIAGNOSIS — R07.89 ATYPICAL CHEST PAIN: Primary | ICD-10-CM

## 2020-02-28 DIAGNOSIS — I49.3 PVC'S (PREMATURE VENTRICULAR CONTRACTIONS): ICD-10-CM

## 2020-02-28 DIAGNOSIS — R73.03 PRE-DIABETES: ICD-10-CM

## 2020-02-28 DIAGNOSIS — I10 ESSENTIAL HYPERTENSION: ICD-10-CM

## 2020-02-28 DIAGNOSIS — Z72.0 TOBACCO USE: ICD-10-CM

## 2020-02-28 DIAGNOSIS — E66.01 CLASS 3 SEVERE OBESITY DUE TO EXCESS CALORIES WITHOUT SERIOUS COMORBIDITY WITH BODY MASS INDEX (BMI) OF 50.0 TO 59.9 IN ADULT (HCC): ICD-10-CM

## 2020-02-28 RX ORDER — CITALOPRAM 10 MG/1
TABLET ORAL
COMMUNITY
Start: 2019-12-13 | End: 2020-07-07 | Stop reason: ALTCHOICE

## 2020-02-28 NOTE — PATIENT INSTRUCTIONS
Your chest pain does not sound cardiac; I recommend a CT Heart Scan for additional risk stratification. You need to work on weight loss and smoking cessation. Below are some good weight loss programs in our area.        Dr. Braden Hook with [facility], [address] [place] [address]  [phone] phone   [phone] fax          [address] [facility]  Main: [phone]  Fax: [phone]    Follow-up in 6 months or earlier if ongoing chest pain despite treatment with GI.

## 2020-02-28 NOTE — PROGRESS NOTES
Valerie Preston is a 38 y.o. male    Chief Complaint   Patient presents with    Hypertension    Palpitations     Patient thinks it is a gastro issue. Anxiety with chest pain. Chest pain No    SOB only with gastro problems; he gets a little bubble. Dizziness No    Swelling No    Refills No    Visit Vitals  /78 (BP 1 Location: Left arm, BP Patient Position: Sitting)   Pulse 82   Ht 6' (1.829 m)   Wt (!) 401 lb (181.9 kg)   SpO2 94%   BMI 54.39 kg/m²       1. Have you been to the ER, urgent care clinic since your last visit? Hospitalized since your last visit? No    2. Have you seen or consulted any other health care providers outside of the [address] [place] [address] since your last visit? Include any pap smears or colon screening.   No

## 2020-02-28 NOTE — PROGRESS NOTES
JONATHON Ambrocio  [address]  [place], [address] [facility]    Office [phone]  Fax [phone]      April Hernández is a 38 y.o. male is here for f/u visit with c/o chest pain. Primary care physician:  Lit Good MD    Patient Active Problem List   Diagnosis Code    Essential hypertension, benign I10    PVC's (premature ventricular contractions) I49.3    Other and unspecified hyperlipidemia E78.5    Morbid obesity ([address] E66.01       Chief complaint:  Chief Complaint   Patient presents with    Hypertension    Palpitations     Dear Dr. Romulo Caraballo,    I had the pleasure of seeing Mr. Rosa Isela Lewis in the office today. Assessment:  Atypical chest pain - suspect GI related  HTN  Palpitations - hx of PVC - occurs more often when he is anxious  Morbid obesity, BMI 54  Tobacco Use  Pre-DM, HgA1c 5.4%    Plan:   Stress echocardiogram - nml 11/22/17  Suspect chest pain GI related - pt plans to see [facility]   Recommend CT Heart Scan for additional risk stratification; this would help guide possible ASA/statin therapy in the future  Counselled about wt loss/low eula and low carb diet/ increased exercise  Advise smoking cessation    Lipids per PCP - current LDL OK 94 - not on statin therapy   F/u in 6 months or PRN    Breann Gaona NP    Cardiac Testing/ Procedures: A. Cardiac Cath/PCI:    B.ECHO/SAMPSON:    C.StressNuclear/Stress ECHO/Stress test: Stress echocardiogram November 2017-7 min 31 sec/normal    Stress echo 2/2014 - 8 min;Nml; NmlEF    D. Vascular:    E. EP: 7/30/15 - SR/no PAC/1 PVC; Diary entries correlate with SR    F. Miscellaneous:    EKG 2/28/20 - Sinus Rhythm    Subjective:  April Hernández is a 38 y.o. male who returns for follow up visit    Pt has been having mid-sternal chest discomfort for the last month that occurs mostly after eating and improves with belching. Pt notes he has been more \"gassy\" / bloated recently as well.   Very concerned about his heart and went to urgent care about 2 weeks ago; per pt his EKG was \"normal\" and labs were negative for heart attack. It was recommended he see GI. Pt continues to have increased anxiety regarding possible heart disease. Patient denies any exertional chest pain, palpitations, syncope, orthopnea, edema, or paroxysmal nocturnal dyspnea. Has mild sleep apnea and uses CPAP. Admits to relatively sedentary lifestyle. 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 Has 2 young children and works mostly to keep up with them. Son is autistic and activities are limited. Admits to poor diet and wants to loose weight; feels unable to do so alone. Continues to smoke 1/2-1 ppd. Is trying to quit with the use of e-cigarettes. +family hx of CAD; grandfather with bypass surgery    Recent labs from PCP reveal   , HDL 27, LDL 94, , HgA1c 5.4%    ROS:  Constitutional: Negative for fever, chills, and diaphoresis. Respiratory: Negative for cough, hemoptysis, sputum production, shortness of breath and wheezing. Cardiovascular: Negative for palpitations,claudication, leg swelling and PND. Gastrointestinal: Negative for hnausea, vomiting, blood in stool and melena. Genitourinary: Negative for dysuria and flank pain. Neurological: Negative for focal weakness, seizures, loss of consciousness,   Endo/Heme/Allergies: Negative for abnormal bleeding. Psychiatric/Behavioral: Negative for memory loss. Medications before admission:    Current Outpatient Medications   Medication Sig Dispense    citalopram (CELEXA) 10 mg tablet      losartan (COZAAR) 100 mg tablet TAKE 1 TABLET BY MOUTH DAILY. 90 Tab    atenolol (TENORMIN) 50 mg tablet TAKE 1 TABLET BY MOUTH DAILY. 90 Tab    fluticasone (FLONASE) 50 mcg/actuation nasal spray 2 Sprays by Both Nostrils route as needed.      albuterol (PROVENTIL HFA) 90 mcg/actuation inhaler Take 2 Puffs by inhalation every four (4) hours as needed for Wheezing. 1 Inhaler    inhalational spacing device 1 Each by Does Not Apply route as needed. 1 Device    allopurinol (ZYLOPRIM) 300 mg tablet Take  by mouth daily. No current facility-administered medications for this visit.         Physical Exam:  Visit Vitals  /78 (BP 1 Location: Left arm, BP Patient Position: Sitting)   Pulse 82   Ht 6' (1.829 m)   Wt (!) 401 lb (181.9 kg)   SpO2 94%   BMI 54.39 kg/m²     Gen: Well-developed, well-nourished, morbidly obese  Neck: Supple, thick neck, unable to appreciate JVP  Resp: No accessory muscle use, Clear breath sounds, No rales or rhonchi  Card: RRR - distant heart sounds   Abd:  Protuberant, Soft, non-tender, BS+,   MSK: No cyanosis  Skin: No rashes    Neuro: moving all four extremities , follows commands appropriately  Psych:  Good insight, oriented to person, place , alert, Nml Affect  LE: No edema    Demetra Carcamo NP

## 2020-07-07 ENCOUNTER — OFFICE VISIT (OUTPATIENT)
Dept: CARDIOLOGY CLINIC | Age: 35
End: 2020-07-07

## 2020-07-07 VITALS
HEIGHT: 72 IN | HEART RATE: 65 BPM | DIASTOLIC BLOOD PRESSURE: 78 MMHG | WEIGHT: 315 LBS | SYSTOLIC BLOOD PRESSURE: 132 MMHG | OXYGEN SATURATION: 96 % | BODY MASS INDEX: 42.66 KG/M2

## 2020-07-07 DIAGNOSIS — E66.01 MORBID OBESITY (HCC): ICD-10-CM

## 2020-07-07 DIAGNOSIS — I10 ESSENTIAL HYPERTENSION: Primary | ICD-10-CM

## 2020-07-07 DIAGNOSIS — Z01.818 PRE-OP EXAM: ICD-10-CM

## 2020-07-07 RX ORDER — LORAZEPAM 1 MG/1
TABLET ORAL
COMMUNITY
Start: 2020-07-02

## 2020-07-07 RX ORDER — SERTRALINE HYDROCHLORIDE 50 MG/1
TABLET, FILM COATED ORAL
COMMUNITY
Start: 2020-07-02

## 2020-07-07 NOTE — PROGRESS NOTES
Dallin Munoz is a 38 y.o. male    Chief Complaint   Patient presents with    Hypertension    Palpitations    Pre-op Exam     Colonoscpoy and endoscopy on Monday July 13, 2020 with [facility] Dr. Kory Kwon    Chest pain No; patient states some discomfort in back shoulder blade    SOB No    Dizziness No    Swelling No    Refills No    Visit Vitals  /78 (BP 1 Location: Left arm, BP Patient Position: Sitting)   Pulse 65   Ht 6' (1.829 m)   Wt (!) 382 lb (173.3 kg)   SpO2 96%   BMI 51.81 kg/m²       1. Have you been to the ER, urgent care clinic since your last visit? Hospitalized since your last visit? [facility] ED on 9/6/3355 for lacertion on right leg    2. Have you seen or consulted any other health care providers outside of the [address] [place] [address] since your last visit? Include any pap smears or colon screening.   No

## 2020-07-07 NOTE — PROGRESS NOTES
Nichole Brown MD    [address] [facility], [address] [facility], [place]    Office [phone],PRR [phone]      Jeny Roche is a 38 y.o. male is here for f/u visit . Primary care physician:  Anuj Moreira MD    Patient Active Problem List   Diagnosis Code    Essential hypertension, benign I10    PVC's (premature ventricular contractions) I49.3    Other and unspecified hyperlipidemia E78.5    Morbid obesity ([address] E66.01       Chief complaint:  Chief Complaint   Patient presents with    Hypertension    Palpitations   Mahesh Leal     Dear Dr. Hodan Chaney,    I had the pleasure of seeing Mr. Iker Hernandez in the office today. Assessment:    HTN  Palpitations - hx of PVC - occurs more often when he is anxious  ED visit - 5/14/15 - palpitations. Morbid obesity  Preoperative cardiovascular evaluation prior to EGD/colonoscopy    Plan:     Low risk from a cardiac standpoint for EGD/colonoscopy. Can proceed with surgery. Stress echocardiogram - nml 11/22/17  Counselled about wt loss/low eula diet/exercise   Lipids per PCP  F/u in 6 mths/prn    I appreciate the opportunity to be involved in Mr. Guerrier. See note below for details. Please do not hesitate to contact us with questions or concerns. Nichole Brown MD    Cardiac Testing/ Procedures: A. Cardiac Cath/PCI:    B.ECHO/SAMPSON:    C.StressNuclear/Stress ECHO/Stress test: Stress echocardiogram November 2017-7 min 31 sec/normal    Stress echo 2/2014 - 8 min;Nml; NmlEF    D. Vascular:    E. EP: 7/30/15 - SR/no PAC/1 PVC; Diary entries correlate with SR    F. Miscellaneous:    Subjective:  Jeny Roche is a 38 y.o. male who returns for follow up visit    Patient states that he does not have any chest pain or dyspnea. However has trouble losing weight. States that his anxiety is acting up and he has had intermittent palpitations. No dizziness, syncope. Has had varicose vein stripping right lower extremity. Recently had a laceration right calf for which he has stitches placed. ROS:  (bold if positive, if negative)             Medications before admission:    Current Outpatient Medications   Medication Sig Dispense    sertraline (ZOLOFT) 50 mg tablet      LORazepam (ATIVAN) 1 mg tablet      losartan (COZAAR) 100 mg tablet TAKE 1 TABLET BY MOUTH DAILY. 90 Tab    atenolol (TENORMIN) 50 mg tablet TAKE 1 TABLET BY MOUTH DAILY. 90 Tab    fluticasone (FLONASE) 50 mcg/actuation nasal spray 2 Sprays by Both Nostrils route as needed.  albuterol (PROVENTIL HFA) 90 mcg/actuation inhaler Take 2 Puffs by inhalation every four (4) hours as needed for Wheezing. 1 Inhaler    inhalational spacing device 1 Each by Does Not Apply route as needed. 1 Device    allopurinol (ZYLOPRIM) 300 mg tablet Take  by mouth daily. No current facility-administered medications for this visit. Physical Exam:  Visit Vitals  /78 (BP 1 Location: Left arm, BP Patient Position: Sitting)   Pulse 65   Ht 6' (1.829 m)   Wt (!) 382 lb (173.3 kg)   SpO2 96%   BMI 51.81 kg/m²          Gen: Well-developed, well-nourished, in no acute distress, obese  Neck: Supple,No JVD, No Carotid Bruit,   Resp: No accessory muscle use, Clear breath sounds, No rales or rhonchi  Card: Regular Rate,Rythm,Normal S1, S2, No murmurs, rubs or gallop. No thrills.    Abd:  Soft, non-tender, non-distended,BS+,   MSK: No cyanosis  Skin: No rashes    Neuro: moving all four extremities , follows commands appropriately  Psych:  Good insight, oriented to person, place , alert, Nml Affect  LE: No edema;     Right lower extremity-right calf bandage present      EKG: Sinus rhythm, normal axis, normal intervals      LABS:        Lab Results   Component Value Date/Time    WBC 10.7 05/24/2015 06:30 PM    HGB 14.1 05/24/2015 06:30 PM    HCT 42.4 05/24/2015 06:30 PM    PLATELET 839 81/01/3885 06:30 PM    MCV 88.1 05/24/2015 06:30 PM     Lab Results   Component Value Date/Time    Sodium 138 05/24/2015 06:30 PM    Potassium 4.2 05/24/2015 06:30 PM    Chloride 101 05/24/2015 06:30 PM    CO2 29 05/24/2015 06:30 PM    Anion gap 8 05/24/2015 06:30 PM    Glucose 108 (H) 05/24/2015 06:30 PM    BUN 14 05/24/2015 06:30 PM    Creatinine 0.88 05/24/2015 06:30 PM    BUN/Creatinine ratio 16 05/24/2015 06:30 PM    GFR est AA >60 05/24/2015 06:30 PM    GFR est non-AA >60 05/24/2015 06:30 PM    Calcium 9.2 05/24/2015 06:30 PM       No results found for: APTT  No results found for: INR, INREXT  No components found for: Gunnar Reese MD

## 2021-01-04 ENCOUNTER — OFFICE VISIT (OUTPATIENT)
Dept: CARDIOLOGY CLINIC | Age: 36
End: 2021-01-04
Payer: COMMERCIAL

## 2021-01-04 VITALS
RESPIRATION RATE: 96 BRPM | BODY MASS INDEX: 42.66 KG/M2 | HEIGHT: 72 IN | HEART RATE: 76 BPM | SYSTOLIC BLOOD PRESSURE: 138 MMHG | WEIGHT: 315 LBS | DIASTOLIC BLOOD PRESSURE: 80 MMHG

## 2021-01-04 DIAGNOSIS — I49.3 PVC'S (PREMATURE VENTRICULAR CONTRACTIONS): ICD-10-CM

## 2021-01-04 DIAGNOSIS — E66.01 MORBID OBESITY (HCC): ICD-10-CM

## 2021-01-04 DIAGNOSIS — I10 ESSENTIAL HYPERTENSION, BENIGN: Primary | ICD-10-CM

## 2021-01-04 PROCEDURE — 99214 OFFICE O/P EST MOD 30 MIN: CPT | Performed by: INTERNAL MEDICINE

## 2021-01-04 RX ORDER — CLOBETASOL PROPIONATE 0.5 MG/G
OINTMENT TOPICAL 2 TIMES DAILY
COMMUNITY

## 2021-01-04 NOTE — PROGRESS NOTES
Vidhi Tucker MD    [address] [facility], [address] [facility]    Office [phone],URSZULA [phone]      Joesph Concepcion is a 39 y.o. male is here for f/u visit . Dear Dr. Ziggy Taylor,    I had the pleasure of seeing Mr. Favian Darke in the office today. Assessment:    HTN  Palpitations - hx of PVC - occurs more often when he is anxious  ED visit - 5/14/15 - palpitations. Morbid obesity  History of varicose vein stripping right lower extremity    Plan:       Stress echocardiogram - nml 11/22/17  Counselled about wt loss/low eula diet/exercise   Lipids per PCP- nml per pt when checked recently  F/u in 6 mths/prn    I appreciate the opportunity to be involved in Mr. Guerrier. See note below for details. Please do not hesitate to contact us with questions or concerns. Vidhi Tucker MD    Cardiac Testing/ Procedures: A. Cardiac Cath/PCI:    B.ECHO/SAMPSON:    C.StressNuclear/Stress ECHO/Stress test: Stress echocardiogram November 2017-7 min 31 sec/normal    Stress echo 2/2014 - 8 min;Nml; NmlEF    D. Vascular:    E. EP: 7/30/15 - SR/no PAC/1 PVC; Diary entries correlate with SR    F. Miscellaneous:    Subjective:  Joesph Concepcion is a 39 y.o. male who returns for follow up visit    Patient states that he does not have any chest pain or dyspnea. However has trouble losing weight. Lost about 40 lbs but has regained about 30 lbs. H xof anxiety/ intermittent palpitations. No dizziness, syncope. Has had varicose vein stripping right lower extremity. ROS:  (bold if positive, if negative)             Medications before admission:    Current Outpatient Medications   Medication Sig Dispense    clobetasoL (TEMOVATE) 0.05 % ointment Apply  to affected area two (2) times a day.  sertraline (ZOLOFT) 50 mg tablet      LORazepam (ATIVAN) 1 mg tablet      losartan (COZAAR) 100 mg tablet TAKE 1 TABLET BY MOUTH DAILY.  90 Tab    atenolol (TENORMIN) 50 mg tablet TAKE 1 TABLET BY MOUTH DAILY. 90 Tab    fluticasone (FLONASE) 50 mcg/actuation nasal spray 2 Sprays by Both Nostrils route as needed.  albuterol (PROVENTIL HFA) 90 mcg/actuation inhaler Take 2 Puffs by inhalation every four (4) hours as needed for Wheezing. 1 Inhaler    inhalational spacing device 1 Each by Does Not Apply route as needed. 1 Device    allopurinol (ZYLOPRIM) 300 mg tablet Take  by mouth daily. No current facility-administered medications for this visit. Physical Exam:  Visit Vitals  /80 (BP 1 Location: Left arm, BP Patient Position: Sitting)   Pulse 76   Resp (!) 96   Ht 6' (1.829 m)   Wt (!) 396 lb (179.6 kg)   BMI 53.71 kg/m²          Gen: Well-developed, well-nourished, in no acute distress, obese  Neck: Supple,No JVD, No Carotid Bruit,   Resp: No accessory muscle use, Clear breath sounds, No rales or rhonchi  Card: Regular Rate,Rythm,Normal S1, S2, No murmurs, rubs or gallop. No thrills.    Abd:  Soft, non-tender, non-distended,BS+,   MSK: No cyanosis  Skin: No rashes    Neuro: moving all four extremities , follows commands appropriately  Psych:  Good insight, oriented to person, place , alert, Nml Affect  LE: No edema;           EKG:      LABS:              Yasmin Rushing MD

## 2021-01-04 NOTE — PROGRESS NOTES
Cameron Hopkins is a 39 y.o. male    Visit Vitals  /80 (BP 1 Location: Left arm, BP Patient Position: Sitting)   Pulse 76   Resp (!) 96   Ht 6' (1.829 m)   Wt (!) 396 lb (179.6 kg)   BMI 53.71 kg/m²       Chief Complaint   Patient presents with    Hypertension       Chest pain NO  SOB YES WHEN ACTIVE  Dizziness NO  Swelling NO  Recent hospital visit NO  Refills NO

## 2021-01-04 NOTE — LETTER
1/4/2021 Patient: Demi Marquez YOB: 1985 Date of Visit: 1/4/2021 Iva Sahu MD 
[address] [address] [address] Fax: [phone] Dear Iva Sahu MD, Thank you for referring Mr. Demi Marquez to [facility] for evaluation. My notes for this consultation are attached. If you have questions, please do not hesitate to call me. I look forward to following your patient along with you. Sincerely, Ramona Manley MD

## 2022-01-11 RX ORDER — DICLOFENAC SODIUM 75 MG/1
75 TABLET, DELAYED RELEASE ORAL
COMMUNITY

## 2022-01-11 RX ORDER — HYDROCHLOROTHIAZIDE 25 MG/1
25 TABLET ORAL EVERY MORNING
COMMUNITY

## 2022-01-11 RX ORDER — MONTELUKAST SODIUM 10 MG/1
10 TABLET ORAL EVERY EVENING
COMMUNITY

## 2022-01-11 NOTE — PERIOP NOTES
[address] [facility] [address], [address] [facility]   MAIN OR                                  [phone]   MAIN PRE OP                          [phone]                                                                                AMBULATORY PRE OP          [phone]  PRE-ADMISSION TESTING    [phone]   Surgery Date: Wednesday 1/19/22       Is surgery arrival time given by surgeon? NO  If NO, [facility] staff will call you between 3 and 7pm the day before your surgery with your arrival time. (If your surgery is on a Monday, we will call you the Friday before.)    Call [phone] after 7pm Monday-Friday if you did not receive this call. INSTRUCTIONS BEFORE YOUR SURGERY   When You  Arrive Arrive at the 2nd [address] [facility] on the day of your surgery  Have your insurance card, photo ID, and any copayment (if needed)   Food   and   Drink NO food or drink after midnight the night before surgery    This means NO water, gum, mints, coffee, juice, etc.  No alcohol (beer, wine, liquor) 24 hours before and after surgery   Medications to   TAKE   Morning of Surgery MEDICATIONS TO TAKE THE MORNING OF SURGERY WITH A SIP OF WATER:    ALBUTEROL IF NEEDED   ATENOLOL   ALLOPURINOL   SERTRALINE   FLONASE IF NEEDED   LORAZEPAM IF NEEDED   Medications  To  STOP      7 days before surgery  Non-Steroidal anti-inflammatory Drugs (NSAID's): for example, Ibuprofen (Advil, Motrin), Naproxen (Aleve)   Aspirin, if taking for pain    Herbal supplements, vitamins, and fish oil   Other:STOP DICLOFENAC 7 DAYS PRIOR TO SURGERY  (Pain medications not listed above, including Tylenol may be taken)   Blood  Thinners  If you take  Aspirin, Plavix, Coumadin, or any blood-thinning or anti-blood clot medicine, talk to the doctor who prescribed the medications for pre-operative instructions.    Bathing Clothing  Jewelry  Valuables      If you shower the morning of surgery, please do not apply anything to your skin (lotions, powders, deodorant, or makeup, especially mascara)   Follow Chlorhexidine Care Fusion body wash instructions provided to you during PAT appointment. Begin 3 days prior to surgery.  Do not shave or trim anywhere 24 hours before surgery   Wear your hair loose or down; no pony-tails, buns, or metal hair clips   Wear loose, comfortable, clean clothes   Wear glasses instead of contacts  [employer] money, valuables, and jewelry, including body piercings, at home   If you were given an Crunchbutton, bring it on day of surgery. Going Home - or Spending the Night  SAME-DAY SURGERY: You must have a responsible adult drive you home and stay with you 24 hours after surgery   ADMITS: If your doctor is keeping you in the hospital after surgery, leave personal belongings/luggage in your car until you have a hospital room number. Hospital discharge time is 12 noon  Drivers must be here before 12 noon unless you are told differently   Special Instructions PLEASE BRING BIPAP WITH YOU ON DAY OF SURGERY    It is now mandated that all surgical patients be tested for COVID-19 prior to surgery. Testing has to be exactly 4 days prior to surgery. Your COVID test date is Friday 1/14/22 between 8:00 am and 11:00 am.       COVID testing will be performed curbside at the [facility] [address]. There will be signs leading you to the testing site. You will need to bring a photo ID with you to be swabbed. Patients are advised to self-quarantine at home after testing and prior to your surgery date. You will be notified if your results are positive.     What to watch for:   Coronavirus (COVID-19) affects different people in different ways   It also appears with a wide range of symptoms from mild to severe   Signs usually appear 2-14 days after exposure     If you develop any of the following, notify your doctor immediately:  o Fever  o Chills, with or without a shiver  o Muscle pain  o Headache  o Sore throat  o Dry cough  o New loss of taste or smell  o Tiredness      If you develop any of the following, call [phone]:  o Shortness of breath  o Difficulty breathing  o Chest pain  o New confusion  o Blueness of fingers and/or lips     Follow all instructions so your surgery wont be cancelled. Please, be on time. If a situation occurs and you are delayed the day of surgery, call [phone] or [phone]. If your physical condition changes (like a fever, cold, flu, etc.) call your surgeon. Home medication(s) reviewed and verified via   PHONE   during PAT appointment. The patient was contacted by  PHONE    The patient verbalizes understanding of all instructions and   DOES NOT   need reinforcement.

## 2022-01-14 ENCOUNTER — HOSPITAL ENCOUNTER (OUTPATIENT)
Dept: PREADMISSION TESTING | Age: 37
Discharge: HOME | End: 2022-01-14
Payer: COMMERCIAL

## 2022-01-14 LAB
SARS-COV-2, XPLCVT: NOT DETECTED
SOURCE, COVRS: NORMAL

## 2022-01-14 PROCEDURE — U0005 INFEC AGEN DETEC AMPLI PROBE: HCPCS

## 2022-01-18 ENCOUNTER — ANESTHESIA EVENT (OUTPATIENT)
Dept: SURGERY | Age: 37
End: 2022-01-18
Payer: COMMERCIAL

## 2022-01-19 ENCOUNTER — HOSPITAL ENCOUNTER (OUTPATIENT)
Age: 37
Setting detail: OUTPATIENT SURGERY
Discharge: HOME | End: 2022-01-19
Attending: ORTHOPAEDIC SURGERY | Admitting: ORTHOPAEDIC SURGERY
Payer: COMMERCIAL

## 2022-01-19 ENCOUNTER — ANESTHESIA (OUTPATIENT)
Dept: SURGERY | Age: 37
End: 2022-01-19
Payer: COMMERCIAL

## 2022-01-19 VITALS
HEART RATE: 66 BPM | BODY MASS INDEX: 41.75 KG/M2 | WEIGHT: 315 LBS | SYSTOLIC BLOOD PRESSURE: 134 MMHG | RESPIRATION RATE: 20 BRPM | TEMPERATURE: 98.4 F | OXYGEN SATURATION: 96 % | HEIGHT: 73 IN | DIASTOLIC BLOOD PRESSURE: 73 MMHG

## 2022-01-19 DIAGNOSIS — R22.32 MASS OF LEFT HAND: ICD-10-CM

## 2022-01-19 DIAGNOSIS — G56.02 CARPAL TUNNEL SYNDROME ON LEFT: Primary | ICD-10-CM

## 2022-01-19 PROCEDURE — 74011250636 HC RX REV CODE- 250/636: Performed by: ANESTHESIOLOGY

## 2022-01-19 PROCEDURE — 76210000046 HC AMBSU PH II REC FIRST 0.5 HR: Performed by: ORTHOPAEDIC SURGERY

## 2022-01-19 PROCEDURE — 77030006689 HC BLD OPHTH BVR BD -A: Performed by: ORTHOPAEDIC SURGERY

## 2022-01-19 PROCEDURE — 77030040361 HC SLV COMPR DVT MDII -B

## 2022-01-19 PROCEDURE — 74011250636 HC RX REV CODE- 250/636: Performed by: ORTHOPAEDIC SURGERY

## 2022-01-19 PROCEDURE — 77030002986 HC SUT PROL J&J -A: Performed by: ORTHOPAEDIC SURGERY

## 2022-01-19 PROCEDURE — 74011000250 HC RX REV CODE- 250: Performed by: ORTHOPAEDIC SURGERY

## 2022-01-19 PROCEDURE — 77030003601 HC NDL NRV BLK BBMI -A

## 2022-01-19 PROCEDURE — 77030000032 HC CUF TRNQT ZIMM -B: Performed by: ORTHOPAEDIC SURGERY

## 2022-01-19 PROCEDURE — 76030000000 HC AMB SURG OR TIME 0.5 TO 1: Performed by: ORTHOPAEDIC SURGERY

## 2022-01-19 PROCEDURE — 77030006602 HC BLD CRPL AGEE MCRA -C: Performed by: ORTHOPAEDIC SURGERY

## 2022-01-19 PROCEDURE — A4565 SLINGS: HCPCS

## 2022-01-19 PROCEDURE — 76060000061 HC AMB SURG ANES 0.5 TO 1 HR: Performed by: ORTHOPAEDIC SURGERY

## 2022-01-19 PROCEDURE — 2709999900 HC NON-CHARGEABLE SUPPLY: Performed by: ORTHOPAEDIC SURGERY

## 2022-01-19 PROCEDURE — 74011250636 HC RX REV CODE- 250/636: Performed by: NURSE ANESTHETIST, CERTIFIED REGISTERED

## 2022-01-19 PROCEDURE — 77030040922 HC BLNKT HYPOTHRM STRY -A

## 2022-01-19 RX ORDER — FENTANYL CITRATE 50 UG/ML
INJECTION, SOLUTION INTRAMUSCULAR; INTRAVENOUS AS NEEDED
Status: DISCONTINUED | OUTPATIENT
Start: 2022-01-19 | End: 2022-01-19 | Stop reason: HOSPADM

## 2022-01-19 RX ORDER — CLINDAMYCIN PHOSPHATE 600 MG/50ML
600 INJECTION, SOLUTION INTRAVENOUS ONCE
Status: COMPLETED | OUTPATIENT
Start: 2022-01-19 | End: 2022-01-19

## 2022-01-19 RX ORDER — NALOXONE HYDROCHLORIDE 0.4 MG/ML
0.2 INJECTION, SOLUTION INTRAMUSCULAR; INTRAVENOUS; SUBCUTANEOUS
Status: DISCONTINUED | OUTPATIENT
Start: 2022-01-19 | End: 2022-01-19 | Stop reason: HOSPADM

## 2022-01-19 RX ORDER — HYDROMORPHONE HYDROCHLORIDE 1 MG/ML
.5-1 INJECTION, SOLUTION INTRAMUSCULAR; INTRAVENOUS; SUBCUTANEOUS
Status: DISCONTINUED | OUTPATIENT
Start: 2022-01-19 | End: 2022-01-19 | Stop reason: HOSPADM

## 2022-01-19 RX ORDER — ROPIVACAINE HYDROCHLORIDE 5 MG/ML
INJECTION, SOLUTION EPIDURAL; INFILTRATION; PERINEURAL AS NEEDED
Status: DISCONTINUED | OUTPATIENT
Start: 2022-01-19 | End: 2022-01-19 | Stop reason: HOSPADM

## 2022-01-19 RX ORDER — SODIUM CHLORIDE, SODIUM LACTATE, POTASSIUM CHLORIDE, CALCIUM CHLORIDE 600; 310; 30; 20 MG/100ML; MG/100ML; MG/100ML; MG/100ML
125 INJECTION, SOLUTION INTRAVENOUS CONTINUOUS
Status: DISCONTINUED | OUTPATIENT
Start: 2022-01-19 | End: 2022-01-19 | Stop reason: HOSPADM

## 2022-01-19 RX ORDER — LIDOCAINE HYDROCHLORIDE 10 MG/ML
0.1 INJECTION, SOLUTION EPIDURAL; INFILTRATION; INTRACAUDAL; PERINEURAL AS NEEDED
Status: DISCONTINUED | OUTPATIENT
Start: 2022-01-19 | End: 2022-01-19 | Stop reason: HOSPADM

## 2022-01-19 RX ORDER — SODIUM CHLORIDE 0.9 % (FLUSH) 0.9 %
5-40 SYRINGE (ML) INJECTION AS NEEDED
Status: DISCONTINUED | OUTPATIENT
Start: 2022-01-19 | End: 2022-01-19 | Stop reason: HOSPADM

## 2022-01-19 RX ORDER — FLUMAZENIL 0.1 MG/ML
0.2 INJECTION INTRAVENOUS
Status: DISCONTINUED | OUTPATIENT
Start: 2022-01-19 | End: 2022-01-19 | Stop reason: HOSPADM

## 2022-01-19 RX ORDER — MIDAZOLAM HYDROCHLORIDE 1 MG/ML
INJECTION, SOLUTION INTRAMUSCULAR; INTRAVENOUS AS NEEDED
Status: DISCONTINUED | OUTPATIENT
Start: 2022-01-19 | End: 2022-01-19 | Stop reason: HOSPADM

## 2022-01-19 RX ORDER — ONDANSETRON 2 MG/ML
4 INJECTION INTRAMUSCULAR; INTRAVENOUS AS NEEDED
Status: DISCONTINUED | OUTPATIENT
Start: 2022-01-19 | End: 2022-01-19 | Stop reason: HOSPADM

## 2022-01-19 RX ORDER — SODIUM CHLORIDE 0.9 % (FLUSH) 0.9 %
5-40 SYRINGE (ML) INJECTION EVERY 8 HOURS
Status: DISCONTINUED | OUTPATIENT
Start: 2022-01-19 | End: 2022-01-19 | Stop reason: HOSPADM

## 2022-01-19 RX ORDER — SODIUM CHLORIDE, SODIUM LACTATE, POTASSIUM CHLORIDE, CALCIUM CHLORIDE 600; 310; 30; 20 MG/100ML; MG/100ML; MG/100ML; MG/100ML
100 INJECTION, SOLUTION INTRAVENOUS CONTINUOUS
Status: DISCONTINUED | OUTPATIENT
Start: 2022-01-19 | End: 2022-01-19 | Stop reason: HOSPADM

## 2022-01-19 RX ORDER — PROPOFOL 10 MG/ML
INJECTION, EMULSION INTRAVENOUS
Status: DISCONTINUED | OUTPATIENT
Start: 2022-01-19 | End: 2022-01-19 | Stop reason: HOSPADM

## 2022-01-19 RX ADMIN — ROPIVACAINE HYDROCHLORIDE 15 ML: 5 INJECTION, SOLUTION EPIDURAL; INFILTRATION; PERINEURAL at 12:54

## 2022-01-19 RX ADMIN — PROPOFOL 200 MCG/KG/MIN: 10 INJECTION, EMULSION INTRAVENOUS at 13:47

## 2022-01-19 RX ADMIN — CLINDAMYCIN PHOSPHATE 600 MG: 600 INJECTION, SOLUTION INTRAVENOUS at 13:50

## 2022-01-19 RX ADMIN — SODIUM CHLORIDE, POTASSIUM CHLORIDE, SODIUM LACTATE AND CALCIUM CHLORIDE: 600; 310; 30; 20 INJECTION, SOLUTION INTRAVENOUS at 12:45

## 2022-01-19 RX ADMIN — MIDAZOLAM 2 MG: 1 INJECTION, SOLUTION INTRAMUSCULAR; INTRAVENOUS at 12:48

## 2022-01-19 RX ADMIN — SODIUM CHLORIDE, POTASSIUM CHLORIDE, SODIUM LACTATE AND CALCIUM CHLORIDE 125 ML/HR: 600; 310; 30; 20 INJECTION, SOLUTION INTRAVENOUS at 12:27

## 2022-01-19 RX ADMIN — ROPIVACAINE HYDROCHLORIDE 30 ML: 5 INJECTION, SOLUTION EPIDURAL; INFILTRATION; PERINEURAL at 13:04

## 2022-01-19 RX ADMIN — FENTANYL CITRATE 100 MCG: 50 INJECTION INTRAMUSCULAR; INTRAVENOUS at 12:48

## 2022-01-19 NOTE — PERIOP NOTES
POST ANESTHESIA CARE    DISCHARGE / TRANSFER NOTE  Kary Blackmon was:    [x] discharged        via   [x] Wheelchair          to [x] Private Vehicle     [] transferred   [] Carried   [] Taxi / Vehicle \"for Hire\"  [] Walk out  [] Ambulance / Medical Transportation   [] Stretcher  [] Hospital room _**_          [] Bed      Patient was escorted by:      [x] Nurse   [] Volunteer [] Transporter / Technician  [] Parent      [] Spouse / Family /      Patient verbalized     [x] appreciation and was very pleased with care received   [] frustration with care received       throughout their stay. Patient was discharged in     [x] pleasant mood  [] sad mood  [] mad mood . Pain at discharge/transfer was      0  /10. Discharge, medication and follow-up instructions were verbalized as understood prior to discharge  (if applicable for same-day procedures being discharged.)    All personal belongings have been returned to patient, and patient/family verbally confirm receiving belongings as all present.

## 2022-01-19 NOTE — ANESTHESIA PROCEDURE NOTES
Peripheral Block    Start time: 1/19/2022 12:48 PM  End time: 1/19/2022 1:04 PM  Performed by: Rita Camejo MD  Authorized by: Rita Camejo MD       Pre-procedure: Indications: at surgeon's request and primary anesthetic    Preanesthetic Checklist: patient identified, risks and benefits discussed, site marked, timeout performed, anesthesia consent given and patient being monitored    Timeout Time: 12:48 EST          Block Type:   Block Type:  Supraclavicular and axillary  Laterality:  Left  Monitoring:  Continuous pulse ox, frequent vital sign checks, heart rate, responsive to questions and oxygen  Injection Technique:  Single shot  Procedures: ultrasound guided    Patient Position: supine  Prep: chlorhexidine    Location:  Supraclavicular  Needle Type:  Stimuplex  Needle Gauge:  21 G  Needle Localization:  Anatomical landmarks and ultrasound guidance    Assessment:  Number of attempts:  1  Injection Assessment:  Incremental injection every 5 mL, local visualized surrounding nerve on ultrasound, negative aspiration for blood, no paresthesia and no intravascular symptoms  Patient tolerance:  Patient tolerated the procedure well with no immediate complications  15 ml of 1.5% Ropivicaine used for Supraclavicular block then Dr. Pallavi Orta switched to an axillary block due to poor visualization of needle for supraclavicular block.  30 mls of 0.5% Ropivicaine used for axillary block

## 2022-01-19 NOTE — H&P
Date of Surgery Update:  Moira Barbosa was seen and examined. History and physical has been reviewed. The patient has been examined.  There have been no significant clinical changes since the completion of the originally dated History and Physical.    Signed By: MALOU Dupree     January 19, 2022 12:27 PM

## 2022-01-19 NOTE — ANESTHESIA POSTPROCEDURE EVALUATION
Procedure(s):  LEFT ENDOSCOPIC  CARPAL TUNNEL, LEFT PALM MASS EXCISION (REGIONAL BLOCK W/MAC). regional    Anesthesia Post Evaluation      Multimodal analgesia: multimodal analgesia used between 6 hours prior to anesthesia start to PACU discharge  Patient location during evaluation: bedside  Patient participation: complete - patient participated  Level of consciousness: awake  Pain management: adequate  Airway patency: patent  Anesthetic complications: no  Cardiovascular status: acceptable  Respiratory status: acceptable  Hydration status: acceptable        INITIAL Post-op Vital signs:   Vitals Value Taken Time   /73 01/19/22 1445   Temp 36.9 °C (98.4 °F) 01/19/22 1429   Pulse 67 01/19/22 1447   Resp 18 01/19/22 1447   SpO2 91 % 01/19/22 1447   Vitals shown include unvalidated device data.

## 2022-01-19 NOTE — DISCHARGE INSTRUCTIONS
MD Dr. Paula Teague Dr., MD Dr. Caesar Burrow. Milka Grewal MD    You have undergone surgery by one of our hand specialists. Please follow these instructions to ensure a safe and speedy recovery. 1. SURGICAL DRESSING (bandage): Your bandage should be kept dry and in place until you return to the office for your follow up visit. This helps to guard against infection. [x]         You can shower if you place a plastic bag over your dressing.    []         You will need to sponge bathe until you are seen in the office. 2. ELEVATION:  It is VERY IMPORTANT that you keep your arm and hand above the level of your heart at all times, awake or asleep. The higher you elevate your hand, the less it will swell, and the less it will hurt. It is best to elevate the hand as shown below:              Laying down, especially at night,  with your arm elevated on pillows help keep your shoulder and elbow from getting stiff. 3. Ice bags / ice packs can be used to help control pain. If you make your own ice bag, double-bagging helps to prevent leaks. 4. MEDICATIONS:  You have been given a prescription for pain medications. Take it according to the instructions on the bottle. DO NOT drink alcohol while taking pain medications. DO NOT drive, operate heavy machinery, or make important personal or business decisions since the medications will make you drowsy. We do NOT refill prescriptions over the weekend. Please arrange to call for prescription refills during regular office hours. PRESCRIPTION SENT TO:   [facility]  [address] [facility]      5. APPOINTMENT:  Your post operative appointment has been made for the following time:    TIME:    1:45pm           DATE:      1/31/22         At the:        [x]  [address] [facility] Office      6.  AFTER GENERAL ANESTHESIA or IV SEDATION:   DO NOT drink alcohol or drive, work around [place] [address], or make important personal or business decisions. Limit your activity for 24 hours. Resume your diet with light foods (Jell-o ®, clear soups, clear fluids, caffeine-free drinks). If you do not have any nausea, you may resume your regular diet. We at [address] [place] [address] want your surgery and recovery to be as comfortable and successful as possible. Should you have problems, please call our office during the morning hours. In particular, call if your pain is not adequately controlled by prescribed medication, temperature is over 101 degrees, or your bandage is wet or has a four odor. Your doctor contact information:   [facility] [phone]  Nilton Sutton, ext [phone] [facility] END OFFICE - [address] [facility], [address]  [place], [address] from Your Nurse    PATIENT INSTRUCTIONS:    AFTER ANESTHESIA & SEDATION, and WHILE TAKING PAIN MEDICINE  After general anesthesia / intravenous sedation and the 24 hours following, and/or while taking prescription Opiates:  · Limit your activities  · Do not drive and operate hazardous machinery until you have been of all narcotics and sedatives for over 24 hours  · Do not make important personal or business decisions  · Do  not drink alcoholic beverages  · If you have not urinated within 8 hours after discharge, please contact your surgeon on call.         SIGNS OF INFECTION, THINGS TO REPORT TO YOUR DOCTOR  Report the following Signs of Infection or General Problems after surgery to your surgeon:  · Excessive pain, swelling, redness, drainage, pus or odor of or around the surgical area  · Fever/ temperature over 101; Temperature over 100 if on medications (chemotherapy or medicines which affect your ability to fight infections)  · Nausea and vomiting lasting longer than 4 hours or if unable to take medications  · Any signs of decreased circulation or nerve impairment to extremity: change in color, persistent  numbness, tingling, coldness or increase pain  · If you have any questions. GOOD HELP TO FIGHT AN INFECTION  Here are a few tip to help reduce the chance of getting an infection after surgery:   Wash Your Hands   Good handwashing is the most important thing you and your caregiver can do.  Wash before and after caring for any wounds. Dry your hand with a clean towel.  Wash with soap and water for at least 20 seconds. A TIP: sing the \"Happy Birthday\" song through one time while washing to help with the timing.  Use a hand  in between washings.  Shower   When your surgeon says it is OK to take a shower, use a new bar of antibacterial soap (if that is what you use, and keep that bar of soap ONLY for your use), or antibacterial body wash.  Use a clean wash cloth or sponge when you bathe.  Dry off with a clean towel  after every bath - be careful around any wounds, skin staples, sutures or surgical glue over/on wounds.  Do not enter swimming pools, hot tubs, lakes, rivers and/or ocean until wounds are healed and your doctor/surgeon says it is OK.  Use Clean Sheets   Sleep on freshly laundered sheets after your surgery.  Keep the surgery site covered with a clean, dry bandage (if instructed to do so). If the bandage becomes soiled, reapply a new, dry, clean bandage.  Do not allow pets to sleep with you while your wound is healing.  Lifestyle Modification and Controlling Your Blood Sugar   Smoking slows wound healing. Stop smoking and limit exposure to second-hand smoke.  High blood sugar slows wound healing. Eat a well-balanced diet to provide proper nutrition while healing   Monitor your blood sugar (if you are a diabetic) and take your medications as you are suppose to so you can control you blood sugar after surgery. COUGH AND DEEP BREATHE  Breathing deep and coughing are very important exercises to do after surgery.   Deep breathing and coughing open the little air tubes and air sacks in your lungs. You take deep breaths every day. You may not even notice - it is just something you do when you sigh or yawn. It is a natural exercise you do to keep these air passages open. After surgery, take deep breaths and cough, on purpose. Coughing and deep breathing help prevent bronchitis and pneumonia after surgery. If you had chest or belly surgery, use a pillow as a \"hug luana\" and hold it tightly to your chest or belly when you cough. DIRECTIONS:  1. Take 10 to 15 slow deep breaths every hour while awake. 2. Breathe in deeply, and hold it for 2 seconds. 3. Exhale slowly through puckered lips, like blowing up a balloon. 4. After every 4th or 5th deep breath, hug your pillow to your chest or belly and give a hard, deep cough. Yes, it will probably hurt if you had abdominal surgery. But doing this exercise is very important part of healing after surgery. Take your pain medicine to help you do this exercise without too much pain. ANKLE PUMPS    Ankle pumps increase the circulation of oxygenated blood to your lower extremities and decrease your risk for circulation problems such as blood clots. They also stretch the muscles, tendons and ligaments in your foot and ankle, and prevent joint contracture in the ankle and foot, especially after surgeries on the legs. It is important to do ankle pump exercises regularly after surgery because immobility increases your risk for developing a blood clot. Your doctor may also have you take an Aspirin for the next few days as well. If your doctor did not ask you to take an Aspirin, consult with him before starting Aspirin therapy on your own. The exercise is quite simple. · Slowly point your foot forward, feeling the muscles on the top of your lower leg stretch, and hold this position for 5 seconds.                   · Next, pull your foot back toward you as far as possible, stretching the calf muscles, and hold that position for 5 seconds. · Repeat with the other foot. · Perform 10 repetitions every hour while awake for both ankles if possible (down and then up with the foot once is one repetition). You should feel gentle stretching of the muscles in your lower leg when doing this exercise. If you feel pain, or your range of motion is limited, don't push too hard. Only go the limit your joint and muscles will let you go. If you have increasing pain, progressively worsening leg warmth or swelling, STOP the exercise and call your doctor. Other Wound Care information:  [] No additional recommendations. P.R.I.C.E. INSTRUCTIONS    PRICE is an acronym that stands for Protect, Rest, Ice, Compression, and Elevation (sometimes you might see the acronym RICE.)   Listed below are five activities one can do for an injured limb or soft tissue injury. While much anecdotal understanding learned through many years of experience supports these seemingly common sense treatments, building scientific evidence is showing how and why these treatment principles are proving to be so beneficial.  Below is a breakdown of what the PRICE principles entail to speed healing along. PROTECT may sound like an obvious thing to do, and really, it is common sense. After an injury or surgery, protecting the site that hurts help to prevent further injury. REST is essential for an injured limb. Like protection, the more you are up on an injured limb, especially in the early stages of an injury, the more damage you can do. Rest means no activities that would involve the use of the injured tissues so that the early stages of healing can begin without  interruption. ICE \"is perhaps the simplest and oldest [therapy] in the treatment of soft tissue injuries. \"4    Ice help decrease swelling in inflamed and damaged tissues, can diminish the feeling of pain and decrease muscle spasms, and, immediately after an injury,  can slow cellular metabolism and help to prevent further tissue injury from oxygen starvation caused by the swelling. 5      COMPRESSION help decrease pain by limiting movement of an injured limb. Compression can be found through the use of an elastic wrap bandage, a cast, splint, or simply a snug cooling cuff or an ice pack and pillow. ELEVATION is a very important intervention. Placing the injury above the level of the heart whenever possible helps decrease swelling by using gravity to one's advantage . Placing the injury above the level of the heart also helps prevent, or at least decrease, the throbbing pain that is sometimes experienced after surgery or injury. Sources:  1. Muscle injuries: optimizing recovery (2007) Best Practice & Research Clinical Rheumatology Vol. 21, No. 2, pp 339-839, Accessed 9/26/11    2. PRICE first aid guidelines - Protection, Rest, Ice, Compression and Elevation By Nati Garcia About. com Guide, Updated March 27, 2011, Accessed 9/26/11 [email or web address]/rehab/[email or web address]/[email or web address] htm    3. Rest Ice Compression Elevation: RICE for injuries, Accessed 9/26/11 LipLotion.ch. com/rest-ice-compression-elevation. html    4. The use of ice in the treatment of acute soft-tissue injury (2004) [place], Vol. 32, No. 1, pp 251-261, Accessed 9/26/11 http://ajs.[employer].com/content/32/1/251.full.pdf+html    5. Soft tissue damage and healing; theory and techniques, www.iaaf.org, Ch. 9 of  medical page, by sienna Armijo And Teresa Fuentes 9/27/11 FormerIdols.gl. pdf              Going Home After A  Peripheral Nerve Block    Patient Information    The anesthesiology team has provided for your pain control through a technique called regional anesthesia.   As the name implies, anesthesia (decreased or no pain, sensation, or motor control) has been provided to a specific region, whether that be an arm or a leg. How does this happen?  you might ask. While you were sleepy, the anesthesia provider provided medicine to a specific group of nerves either in the neck/shoulder region or in the groin and/or buttock region, similar to the way a dentist might numb a tooth (teeth.)  They typically use an ultrasound machine to know where the medicine is placed in relation to the nerves they wish to numb up or block.   What this means is that for the next few hours, you should expect to have a numb limb. Below are some things we wish for you to read and be familiar with concerning your anesthetized limb. Caring For a Blocked Body Part    General Considerations:   The numbness may last up to 24 hours   You must protect your arm or leg. The blocked extremity is numb, weak, and difficult for your brain to locate and communicate with. To do this you should:  o Pay attention to the position of the blocked limb at all times. o Be very careful when placing hot or cod items on a numb limb. You could cause tissue damage like burns or frostbite if you are unable to feel temperature. Carefully follow your discharge instructions regarding the use of these therapies in you post-operative care. o Carefully pad the affected limb. Normally we continually move and adjust the position of our bodies without thinking about it. This movement and continuous repositioning helps to prevent injuries from immobility. When a limb is numb it still requires this care  o Be extremely careful not to bump or hit the numb body part. This can result in an unrecognized injury, at lease until the blocked limb wakes up. It can also result in worse pain of your already post-surgical limb. Arm/Shoulder Blocks:   You may experience a droopy eyelid, nasal stuffiness, and redness of the eye after receiving an arm/shoulder block.   This is called Leonors Syndrome, and is very common. There is no need for concern. You may also experience mild hoarseness, but all of these symptoms should resolve within 24 hours.  Some patients may experience mild shortness of breath. A sitting position will help alleviate this and it should resolve within 24 hours. If you experience significant or progressive worsening of the shortness of breath, seek medical attention immediately. Knee/Foot Blocks:   DO NOT use the blocked leg to walk, balance or support yourself. Your leg will not be able to balance your weight properly while any part of your leg is numb. You are at a RISK for [address].  Be careful not to drag your foot along the ground or stub your toes. Contact Phone Numbers    CALL 911 IN CASE OF AN EMERGENCY. For all other non-emergency inquiries call the [facility]  at [phone] and ask for the anesthesiologist on call to be paged. Below is information on the medication(s) your doctor is prescribing for you: The maximum daily dose of acetaminophen was discussed with the patient. He was encouraged not to exceed 3,000 mg of acetaminophen during a 24 hour period and was asked to keep in mind that acetaminophen can also be found in many over-the-counter cold medications as well as narcotics that may be given for pain. The patient expresses understanding of these issues and questions were answered. 4 THINGS ABOUT PAIN MEDICINE I ALWAYS TALK ABOUT:  There are 4 side effects I always talk about for pain medications. 1. They make you sleepy and drowsy. Do not drive a car or operate machines while taking pain medication. Do not make any major decisions. Take a nap. Relax. Let your body recover from the affects of anesthesia and surgery. 2. Some people have quite a problem with itching and. ..  3. Nausea and/or vomiting.       These are mention together because they are a related genetic issue; while some people experience these problems, others do not. These are expected and know side effects. Itching is caused by histamine release - practically all opiate medications can cause this. An over-the-counter anti-histamine can help. Over-the-counter Benadryl® (the generic drug name is diphenhydramine) can help, but may cause increased drowsiness which can be intensified by pain medications. Over-the-counter Claritin® (the generic drug name is loratadine) or Zyrtec® (the generic drug name is cetirizine) may be effective without as much drowsiness as with the Benadryl/diphenhydramine. If you have nausea, like the itching, practically all opioids can cause this. Hopefully your surgeon may have given you some medicine for nausea. If your surgeon did not give you anti-nausea medications, and you are experiencing nausea/vomiting that prevent you from drinking clear liquids, CALL HIM/HER and request them, especially if these issues seem to get worse after you leave the hospital.    4. Last but not least is the problem of constipation (not justin able to have a bowel movement - poop.)  All pain medicine can slow down the movement of food through the gut. The slower it goes, the worse it can be. This only adds insult to the injury of surgery. And if you had tummy surgery, like having your gall bladder removed or a hernia repair, YOU DO NOT WANT THIS PROBLEM. There are 4 things I recommend. · Drink lots of fluids. For healthy people with no heart problems, this means at least 64 ounces of liquids or more per day. For example, a Big Gulp® from [employer] is 32 ounces. So you need to drink at least 2  Big Gulp®'s of fluids every day. If you have heart problems you may not be able to do this. Talk to your doctor about what you should do to prevent constipation. · Drinking fruit juice like apple, pear, or prune juice gives you extra \"BANG\" for your beverage. These drinks are high in natural fiber.   If you are a diabetic, drink sugar-free fluids with fiber additives (see next 2 points.)  Avoid drinking extra fruit juice unless this is a regular part of your diet plan. · Eat extra fresh fruits and vegetables. · Add extra fiber-products. Fiber products like Metamucil®, Citrucel®, Miralax® or Benefiber® can help. These products are over-the-counter and you do not need a prescription from your doctor. If you have followed these recommendations and still have some difficulty having a good poop, take and over-the-counter stimulant like Dulcolax® (biscodyl)  or Senokot® (senna concentrate). These may help get things moving. Keshav Sharp MEDICATION AND   SIDE EFFECT GUIDE    The [facility] MEDICATION AND SIDE EFFECT GUIDE was provided to the PATIENT AND CARE PROVIDER. Information provided includes instruction about drug purpose and common side effects for the following medications:   · none      Medication information added to discharge record on January 19, 2022 at 1:23 PM.      Some information we wish all of our patients to be familiar with and General Information for Healthy Lifestyle choices:    · Make a list of your current medications with your Primary Care Provider. · Update this list whenever your medications are discontinued, doses are changed, or new medications (including over-the-counter products like ibuprofen, vitamins, or herbal remedies) are added. · Carry medication information at all times in case of emergency situations      No smoking / No tobacco products / Avoid exposure to second hand smoke    Surgeon General's Warning:  Quitting smoking now greatly reduces serious risk to your health. Obesity, smoking, and sedentary lifestyle greatly increases your risk for illness. A healthy diet, regular physical exercise & weight monitoring are important for maintaining a healthy lifestyle. A Note About Congestive Heart Failure:   You may be retaining fluid if you have a history of heart failure or if you experience any of the following symptoms:      · Weight gain of 3 pounds or more overnight or 5 pounds in a week  · Increased swelling in our hands or feet  · Shortness of breath while lying flat in bed      Please call your doctor as soon as you notice any of these symptoms; do not wait until your next office visit. A Note About Strokes:  Recognize signs and symptoms of STROKE. The simple mnemonic, F.A.S.T., can help you remember signs of a stroke and what to do if you suspect a stroke is occuring to you or someone you are with:    F - Face looks uneven  A - Arms unable to move, or move evenly  S - Speech is slurred or non-existent  T - Time - CALL 911 as soon as signs and symptoms begin - DO NOT go to bed or wait to see if you get better - TIME IS BRAIN. Warning Signs of HEART ATTACK   Call 911 if you have these symptoms:     Chest discomfort. Most heart attacks involve discomfort in the center of the chest that lasts more than a few minutes, or that goes away and comes back. It can feel like uncomfortable pressure, squeezing, fullness, or pain.  Discomfort in other areas of the upper body. Symptoms can include pain or discomfort in one or both arms, the back, neck, jaw, or stomach.  Shortness of breath with or without chest discomfort.  Other signs may include breaking out in a cold sweat, nausea, or lightheadedness. Don't wait more than five minutes to call 911 - MINUTES MATTER! Fast action can save your life. Calling 911 is almost always the fastest way to get lifesaving treatment. Emergency Medical Services staff can begin treatment when they arrive -- up to an hour sooner than if someone gets to the hospital by car. Learning About Coronavirus [phone])  Coronavirus [phone]): Overview  What is coronavirus (COVID-19)? The coronavirus disease (COVID-19) is caused by a virus. It is an illness that was first found in [place], [place], in December 2019.  It has since spread worldwide. The virus can cause fever, cough, and trouble breathing. In severe cases, it can cause pneumonia and make it hard to breathe without help. It can cause death. Coronaviruses are a large group of viruses. They cause the common cold. They also cause more serious illnesses like Middle East respiratory syndrome (MERS) and severe acute respiratory syndrome (SARS). COVID-19 is caused by a novel coronavirus. That means it's a new type that has not been seen in people before. This virus spreads person-to-person through droplets from coughing and sneezing. It can also spread when you are close to someone who is infected. And it can spread when you touch something that has the virus on it, such as a doorknob or a tabletop. What can you do to protect yourself from coronavirus (COVID-19)? The best way to protect yourself from getting sick is to:  · Avoid areas where there is an outbreak. · Avoid contact with people who may be infected. · Wash your hands often with soap or alcohol-based hand sanitizers. · Avoid crowds and try to stay at least 6 feet away from other people. · Wash your hands often, especially after you cough or sneeze. Use soap and water, and scrub for at least 20 seconds. If soap and water aren't available, use an alcohol-based hand . · Avoid touching your mouth, nose, and eyes. What can you do to avoid spreading the virus to others? To help avoid spreading the virus to others:  · Cover your mouth with a tissue when you cough or sneeze. Then throw the tissue in the trash. · Use a disinfectant to clean things that you touch often. · Stay home if you are sick or have been exposed to the virus. Don't go to school, work, or public areas. And don't use public transportation. · If you are sick:  ? Leave your home only if you need to get medical care. But call the doctor's office first so they know you're coming.  And wear a face mask, if you have one.  ? If you have a face mask, wear it whenever you're around other people. It can help stop the spread of the virus when you cough or sneeze. ? Clean and disinfect your home every day. Use household  and disinfectant wipes or sprays. Take special care to clean things that you grab with your hands. These include doorknobs, remote controls, phones, and handles on your refrigerator and microwave. And don't forget countertops, tabletops, bathrooms, and computer keyboards. When to call for help  Call 911 anytime you think you may need emergency care. For example, call if:  · You have severe trouble breathing. (You can't talk at all.)  · You have constant chest pain or pressure. · You are severely dizzy or lightheaded. · You are confused or can't think clearly. · Your face and lips have a blue color. · You pass out (lose consciousness) or are very hard to wake up. Call your doctor now if you develop symptoms such as:  · Shortness of breath. · Fever. · Cough. If you need to get care, call ahead to the doctor's office for instructions before you go. Make sure you wear a face mask, if you have one, to prevent exposing other people to the virus. Where can you get the latest information? The following health organizations are tracking and studying this virus. Their websites contain the most up-to-date information. Carlos Alberto Sara also learn what to do if you think you may have been exposed to the virus. · U.[place]. Centers for Disease Control and Prevention (CDC): The CDC provides updated news about the disease and travel advice. The website also tells you how to prevent the spread of infection. www.cdc.gov  · World Health Organization [facility]): WHO offers information about the virus outbreaks. WHO also has travel advice. www.who.int  Current as of: April 1, 2020               Content Version: 12.4  © 7768-0413 [employer].    Care instructions adapted under license by your healthcare professional. If you have questions about a medical condition or this instruction, always ask your healthcare professional. [place] [address] any warranty or liability for your use of this information. AT THE COMPLETION OF DISCHARGE INSTRUCTION REVIEW, WE VERIFY:  The discharge information has been reviewed with the patient and caregiver. Questions have been asked and answered meeting patient and caregiver expectations. The patient and caregiver verbalized understanding. Your discharge nurse was Michelle Rodriguez RN-BC       Board Certified - Pain Management      CONTENTS FOUND IN YOUR DISCHARGE ENVELOPE:  [x]     Surgeon and Hospital Discharge Instructions  [x]     [facility] Care Provider Card  []     Medication & Side Effect Guide            (your newly prescribed medications have been marked/highlighted showing the most common side effects from the classes of drugs on your prescriptions)  []     Medication Prescription(s) x 0 ( [] These have been sent electronically to your pharmacy by your surgeon,   - OR -       your surgeon has already provided these to you during a previous/pre-op office visit)  [x]     Pain block and/or block with On-Q Catheter from Anesthesia Service (information included in your instructions above)        []    [facility] Education Information  []     Physical Therapy Prescription  []     Follow-up Appointment Cards  []     Surgery-related Pictures/Media  []     Medical device information sheets/pamphlets from their    []     School/work excuse note. []     /parent work excuse note. The following personal items collected during your admission for safe keeping are returned to you:     Dental Appliance: Dental Appliances: None  Vi nancy: Visual Aid: Glasses  Hearing Aid:    Jewelry: Jewelry: None  Clothing: Clothing: Footwear,Shirt,Pants,Jacket/Coat,Undergarments  Other Valuables:  Other Valuables: Cell Phone,Eyeglasses  Valuables sent to safe:

## 2022-01-19 NOTE — ANESTHESIA PREPROCEDURE EVALUATION
Relevant Problems   No relevant active problems       Anesthetic History   No history of anesthetic complications            Review of Systems / Medical History  Patient summary reviewed and pertinent labs reviewed    Pulmonary        Sleep apnea: BiPAP  Smoker  Asthma        Neuro/Psych         Psychiatric history  Pertinent negatives: No seizures, TIA and CVA   Cardiovascular    Hypertension        Dysrhythmias : PVC  Hyperlipidemia  Pertinent negatives: No past MI, angina and CHF  Exercise tolerance: >4 METS     GI/Hepatic/Renal  Within defined limits           Pertinent negatives: No renal disease   Endo/Other        Morbid obesity and arthritis  Pertinent negatives: No diabetes   Other Findings              Physical Exam    Airway  Mallampati: III  TM Distance: 4 - 6 cm  Neck ROM: normal range of motion   Mouth opening: Normal     Cardiovascular      Rate: normal         Dental  No notable dental hx       Pulmonary  Breath sounds clear to auscultation               Abdominal         Other Findings            Anesthetic Plan    ASA: 3  Anesthesia type: regional - brachial plexus block          Induction: Intravenous  Anesthetic plan and risks discussed with: Patient

## 2022-01-19 NOTE — BRIEF OP NOTE
Brief Postoperative Note    Patient: Sangeeta Adan  YOB: 1985  MRN: [id number]    Date of Procedure: 1/19/2022     Pre-Op Diagnosis: Carpal tunnel syndrome on left [G56.02]    Post-Op Diagnosis: Same as preoperative diagnosis. Procedure(s):  LEFT ENDOSCOPIC  CARPAL TUNNEL, LEFT PALM MASS EXCISION (REGIONAL BLOCK W/MAC)    Surgeon(s): Leslie Childs MD    Surgical Assistant: Physician Assistant: MALOU Johnson    Anesthesia: MAC     Estimated Blood Loss (mL): Minimal    Complications: None    Specimens: * No specimens in log *     Implants: * No implants in log *    Drains: * No LDAs found *    Findings: left cystic mass left palm.   Left compression of median nerve    Electronically Signed by MALOU Morales on 1/19/2022 at 2:27 PM

## 2022-01-21 NOTE — OP NOTES
[facility] 78  OPERATIVE REPORT    Name:  Mark Cuevas  MR#:  [id number]  :  1985  ACCOUNT #:  [de-identified]  DATE OF SERVICE:  2022    PREOPERATIVE DIAGNOSES:  1. Left carpal tunnel syndrome. 2.  Left palm mass, base of middle finger. POSTOPERATIVE DIAGNOSES:  1. Left carpal tunnel syndrome. 2.  Left palm mass, base of middle finger. PROCEDURES PERFORMED:  1. Left endoscopic carpal tunnel release. 2.  Left palm mass excision. SURGEON:  Aurelia Vicente MD    ASSISTANT:  MALOU Lee    ANESTHESIA:  Axillary block. COMPLICATIONS:  None. SPECIMENS REMOVED:  none    IMPLANTS:  none    ESTIMATED BLOOD LOSS:  Zero. TOURNIQUET TIME:  25 minutes    INDICATIONS:  The patient is a 26-year-old male with a history of pain, numbness and tingling of the left hand with an associated mass in the palm adjacent to the third webspace and base of the middle finger. The mass has become progressively enlarged and bothersome. Carpal tunnel was confirmed via nerve testing. The patient was counseled regarding the above procedure. Risks, benefits, and potential outcomes were outlined. He elected to proceed. PROCEDURE:  The patient was taken to the operating room and placed supine on the operating table. Following administration of axillary block anesthetic, the left arm was prepped and draped in sterile fashion with Hibiclens and alcohol. Tourniquet was applied to the left proximal arm. The arm was exsanguinated with an Esmarch bandage and the tourniquet inflated to 250 mmHg. An incision was made on the distal forearm parallel to the volar wrist crease transversely. Subcutaneous dissection was carried out and the palmaris longus tendon retracted. The distal forearm fascia was released exposing the median nerve which was carefully dissected. Sequential dilators were placed along the median nerve beneath the transverse carpal ligament.   The endoscope was inserted into the space created by the dilators. The median nerve, flexor tendons, and transverse carpal ligament were identified. With the median nerve under maximal protection, the transverse carpal ligament was divided with the endoscopic blade moving distal to proximal.  Complete release was confirmed. The wound was copiously irrigated with sterile saline. Hemostasis was achieved with bipolar cautery. The nerve was noted to be flattened and hyperemic at the level of the canal.  The wound was repaired using a 4-0 Vicryl interrupted inverted subcu and a 5-0 Prolene subcuticular with benzoin and Steri-Strips. Attention was then turned to the mass in the palm and a curvilinear incision made adjacent to the third webspace and base of the middle finger was created. Subcutaneous dissection was carried out. Neurovascular bundles to the middle and ring finger were identified and protected. Deep to the fascia, a 1-cm mass with a gelatinous component was identified and completely excised from the surrounding soft tissue. The A1 pulley was released to the middle finger. The wound was copiously irrigated with sterile saline. Hemostasis was achieved with bipolar cautery. Complete excision of the mass was confirmed and cautery of its base was performed. No instability of the joint was noted. The wound was then repaired using 5-0 Prolene simple sutures. A sterile bulky soft hand dressing was applied. The tourniquet was let down. The patient awakened from anesthesia and discharged to the recovery room in stable condition. During the procedure, a physician assistant was vital to the outcome of the case providing stability to the arm, retraction of crucial structures, assistance with wound closure, assistance with handling soft tissue and maintaining positioning of the hand during both portions of the procedure. The assistant provided wound closure and dressing application.     DISCHARGE PLAN: The patient was instructed to keep arm elevated, clean and dry at all times, to call with any questions or concerns, and to follow up in 7-10 days for suture removal, wound check and hand therapy referral if indicated. The patient was given a prescription for hydrocodone for pain control.       Stephanie Morrow MD      [id number]  D:  01/21/2022 8:31  T:  01/21/2022 16:00  JOB #:  [id number]

## 2025-02-14 ENCOUNTER — HOSPITAL ENCOUNTER (EMERGENCY)
Facility: HOSPITAL | Age: 40
Discharge: HOME | End: 2025-02-14
Attending: STUDENT IN AN ORGANIZED HEALTH CARE EDUCATION/TRAINING PROGRAM
Payer: MEDICAID

## 2025-02-14 VITALS
TEMPERATURE: 98.4 F | WEIGHT: 315 LBS | RESPIRATION RATE: 18 BRPM | HEART RATE: 79 BPM | HEIGHT: 73 IN | OXYGEN SATURATION: 99 % | BODY MASS INDEX: 41.75 KG/M2 | DIASTOLIC BLOOD PRESSURE: 69 MMHG | SYSTOLIC BLOOD PRESSURE: 163 MMHG

## 2025-02-14 DIAGNOSIS — I83.899 BLEEDING FROM VARICOSE VEIN: Primary | ICD-10-CM

## 2025-02-14 PROCEDURE — 94761 N-INVAS EAR/PLS OXIMETRY MLT: CPT

## 2025-02-14 PROCEDURE — 99283 EMERGENCY DEPT VISIT LOW MDM: CPT

## 2025-02-14 PROCEDURE — 12001 RPR S/N/AX/GEN/TRNK 2.5CM/<: CPT

## 2025-02-14 PROCEDURE — 6360000002 HC RX W HCPCS: Performed by: STUDENT IN AN ORGANIZED HEALTH CARE EDUCATION/TRAINING PROGRAM

## 2025-02-14 RX ORDER — LIDOCAINE HYDROCHLORIDE 10 MG/ML
5 INJECTION, SOLUTION EPIDURAL; INFILTRATION; INTRACAUDAL; PERINEURAL
Status: COMPLETED | OUTPATIENT
Start: 2025-02-14 | End: 2025-02-14

## 2025-02-14 RX ADMIN — LIDOCAINE HYDROCHLORIDE 5 ML: 10 INJECTION, SOLUTION EPIDURAL; INFILTRATION; INTRACAUDAL; PERINEURAL at 11:17

## 2025-02-14 ASSESSMENT — PAIN - FUNCTIONAL ASSESSMENT: PAIN_FUNCTIONAL_ASSESSMENT: 0-10

## 2025-02-14 ASSESSMENT — PAIN SCALES - GENERAL: PAINLEVEL_OUTOF10: 0

## 2025-02-14 NOTE — DISCHARGE INSTRUCTIONS
Please have your stitches removed about 1 week.  Return as needed follow-up with vascular surgery.

## 2025-02-14 NOTE — ED PROVIDER NOTES
Current packs/day: 1.00     Types: Cigarettes    Smokeless tobacco: Current   Substance and Sexual Activity    Alcohol use: Yes    Drug use: No     Social Determinants of Health     Physical Activity: High Risk (5/5/2021)    Received from [facility]    Physical Activity     How often do you engage in moderate physical activity for 30 minutes or more?: Never     How often do you engage in vigorous physical activity for 20 minutes or more?: 1 to 3 days a week     How many hours per day do you spend sitting?: More than 8 hours    Received from [facility]    Stress    Received from [facility]    Intimate Partner Violence           PHYSICAL EXAM    (up to 7 for level 4, 8 or more for level 5)     ED Triage Vitals [02/14/25 1044]   BP Systolic BP Percentile Diastolic BP Percentile Temp Temp Source Pulse Respirations SpO2   (!) 163/69 -- -- 98.4 °F (36.9 °C) Oral 79 18 99 %      Height Weight - Scale         1.854 m (6' 1\") (!) 181.4 kg (400 lb)             Body mass index is 52.77 kg/m².    Physical Exam  Vitals and nursing note reviewed.   Musculoskeletal:         General: Normal range of motion.      Comments: Varicose vein bleeding right anterior ankle   Skin:     General: Skin is warm.      Capillary Refill: Capillary refill takes less than 2 seconds.         DIAGNOSTIC RESULTS     EKG: All EKG's are interpreted by the Emergency Department Physician who either signs or Co-signs this chart in the absence of a cardiologist.        RADIOLOGY:   Non-plain film images such as CT, Ultrasound and MRI are read by the radiologist. Plain radiographic images are visualized and preliminarily interpreted by the emergency physician with the below findings:        Interpretation per the Radiologist below, if available at the time of this note:    No orders to display        LABS:  Labs Reviewed - No data to display    All other labs were within normal range or not returned as of this dictation.    EMERGENCY DEPARTMENT  (electronically signed)  Emergency Attending Physician / Physician Assistant / Nurse Practitioner              Norman Bryant DO  02/14/25 4752

## 2025-02-14 NOTE — ED TRIAGE NOTES
Pt arrives to ED via EMS with c/o varicose vein rupture on R ankle while taking a shower and hit the vein with a loofah     Per EMS, bleeding uncontrolled so they cobanned the ankle

## 2025-02-20 ENCOUNTER — OFFICE VISIT (OUTPATIENT)
Age: 40
End: 2025-02-20
Payer: MEDICAID

## 2025-02-20 ENCOUNTER — TELEPHONE (OUTPATIENT)
Age: 40
End: 2025-02-20

## 2025-02-20 VITALS
RESPIRATION RATE: 18 BRPM | HEART RATE: 82 BPM | WEIGHT: 315 LBS | SYSTOLIC BLOOD PRESSURE: 138 MMHG | DIASTOLIC BLOOD PRESSURE: 74 MMHG | HEIGHT: 73 IN | BODY MASS INDEX: 41.75 KG/M2 | OXYGEN SATURATION: 98 %

## 2025-02-20 DIAGNOSIS — R06.09 DYSPNEA ON EXERTION: ICD-10-CM

## 2025-02-20 DIAGNOSIS — I49.3 PVC'S (PREMATURE VENTRICULAR CONTRACTIONS): Primary | ICD-10-CM

## 2025-02-20 DIAGNOSIS — E66.01 MORBID OBESITY: ICD-10-CM

## 2025-02-20 DIAGNOSIS — I10 ESSENTIAL HYPERTENSION, BENIGN: ICD-10-CM

## 2025-02-20 DIAGNOSIS — R53.83 OTHER FATIGUE: ICD-10-CM

## 2025-02-20 PROCEDURE — 93005 ELECTROCARDIOGRAM TRACING: CPT | Performed by: INTERNAL MEDICINE

## 2025-02-20 PROCEDURE — 3075F SYST BP GE 130 - 139MM HG: CPT | Performed by: INTERNAL MEDICINE

## 2025-02-20 PROCEDURE — 99214 OFFICE O/P EST MOD 30 MIN: CPT | Performed by: INTERNAL MEDICINE

## 2025-02-20 PROCEDURE — 93010 ELECTROCARDIOGRAM REPORT: CPT | Performed by: INTERNAL MEDICINE

## 2025-02-20 PROCEDURE — 3078F DIAST BP <80 MM HG: CPT | Performed by: INTERNAL MEDICINE

## 2025-02-20 RX ORDER — MULTIVIT-MIN/IRON/FOLIC ACID/K 18-600-40
2000 CAPSULE ORAL DAILY
COMMUNITY

## 2025-02-20 RX ORDER — LOSARTAN POTASSIUM AND HYDROCHLOROTHIAZIDE 12.5; 1 MG/1; MG/1
1 TABLET ORAL
COMMUNITY

## 2025-02-20 RX ORDER — CLONAZEPAM 0.5 MG/1
0.5 TABLET ORAL AS NEEDED
COMMUNITY

## 2025-02-20 RX ORDER — ALLOPURINOL 300 MG/1
300 TABLET ORAL DAILY
COMMUNITY
Start: 2024-08-16

## 2025-02-20 RX ORDER — METHOCARBAMOL 500 MG/1
500 TABLET, FILM COATED ORAL AS NEEDED
COMMUNITY

## 2025-02-20 RX ORDER — ALBUTEROL SULFATE 90 UG/1
1 INHALANT RESPIRATORY (INHALATION) EVERY 4 HOURS PRN
COMMUNITY
Start: 2024-08-16

## 2025-02-20 RX ORDER — ATENOLOL 100 MG/1
100 TABLET ORAL
COMMUNITY

## 2025-02-20 RX ORDER — METFORMIN HYDROCHLORIDE 500 MG/1
TABLET, EXTENDED RELEASE ORAL
COMMUNITY
Start: 2024-09-25

## 2025-02-20 RX ORDER — ALBUTEROL SULFATE 0.83 MG/ML
2.5 SOLUTION RESPIRATORY (INHALATION) EVERY 4 HOURS PRN
COMMUNITY

## 2025-02-20 RX ORDER — CLINDAMYCIN PHOSPHATE 10 UG/ML
LOTION TOPICAL 2 TIMES DAILY
COMMUNITY

## 2025-02-20 RX ORDER — MONTELUKAST SODIUM 10 MG/1
10 TABLET ORAL NIGHTLY
COMMUNITY
Start: 2024-08-30

## 2025-02-20 RX ORDER — SERTRALINE HYDROCHLORIDE 100 MG/1
100 TABLET, FILM COATED ORAL DAILY
COMMUNITY
Start: 2024-08-16

## 2025-02-20 NOTE — TELEPHONE ENCOUNTER
Enrolled with [employer] - Ordered and being shipped to patient's home address on file.  ETA within 5-7 business days.

## 2025-02-20 NOTE — PROGRESS NOTES
Chief Complaint   Patient presents with    Hypertension    Palpitations    Other     PVC's         Chest Pain  No        Last Lipids No results found for: \"CHOL\", \"TRIG\", \"HDL\", \"VLDL\", \"CHOLHDLRATIO\"     Refills    /74 (Site: Left Upper Arm, Position: Sitting, Cuff Size: Large Adult)   Pulse 82   Resp 18   Ht 1.854 m (6' 1\")   Wt (!) 188.7 kg (416 lb)   SpO2 98%   BMI 54.88 kg/m²       Have you been to the ER, urgent care clinic since your last visit? No  Hospitalized since your last visit? NO    2. Have you seen or consulted with any other health care providers outside of the [facility] since your last visit?  No  
Recently was able to get health insurance and is here for follow-up care.      ROS:   (bold if positive,  if negative)               Medications before admission:        Current Outpatient Medications   Medication Instructions    albuterol (PROVENTIL) 2.5 mg, Nebulization, EVERY 4 HOURS PRN    albuterol sulfate HFA (PROAIR HFA) 108 (90 Base) MCG/ACT inhaler 1 puff, Inhalation, EVERY 4 HOURS PRN    allopurinol (ZYLOPRIM) 300 mg, Oral, DAILY    atenolol (TENORMIN) 100 mg, Oral, Every Day    clindamycin (CLEOCIN T) 1 % lotion Topical, 2 TIMES DAILY    clonazePAM (KLONOPIN) 0.5 mg, Oral, AS NEEDED    losartan-hydroCHLOROthiazide (HYZAAR) 100-12.5 MG per tablet 1 tablet, Oral, Every Day    metFORMIN (GLUCOPHAGE-XR) 500 MG extended release tablet Oral    methocarbamol (ROBAXIN) 500 mg, Oral, AS NEEDED    montelukast (SINGULAIR) 10 mg, Oral, NIGHTLY    Omega-3 Fatty Acids (OMEGA 3 500 PO) Oral    sertraline (ZOLOFT) 100 mg, Oral, DAILY    vitamin D (CHOLECALCIFEROL) 2,000 Units, Oral, DAILY               Physical Exam:   Visit Vitals     Blood pressure 138/74, pulse 82, resp. rate 18, height 1.854 m (6' 1\"), weight (!) 188.7 kg (416 lb), SpO2 98%.              Gen: Well-developed, well-nourished, in no acute distress, obese   Neck: Supple,No JVD, No Carotid Bruit,    Resp: No accessory muscle use, Clear breath sounds, No rales or rhonchi   Card: Regular Rate,Rythm,Normal S1, S2, No murmurs, rubs or gallop. No thrills.    Abd:  Soft, BS+,    MSK: No cyanosis   Skin: No rashes     Neuro: moving all four extremities , follows commands appropriately   Psych:  Good insight, oriented to person, place , alert, Nml Affect   LE: Trace edema; Varicose veins               EKG: SR/Nml axis/Nml intervals         LABS:                     Hank Cornejo MD

## 2025-03-16 ENCOUNTER — RESULTS FOLLOW-UP (OUTPATIENT)
Age: 40
End: 2025-03-16

## 2025-03-16 ENCOUNTER — CLINICAL DOCUMENTATION (OUTPATIENT)
Age: 40
End: 2025-03-16

## 2025-03-21 ENCOUNTER — ANCILLARY PROCEDURE (OUTPATIENT)
Age: 40
End: 2025-03-21
Payer: MEDICAID

## 2025-03-21 VITALS
BODY MASS INDEX: 41.75 KG/M2 | WEIGHT: 315 LBS | HEIGHT: 73 IN | DIASTOLIC BLOOD PRESSURE: 76 MMHG | HEART RATE: 77 BPM | SYSTOLIC BLOOD PRESSURE: 140 MMHG

## 2025-03-21 DIAGNOSIS — E66.01 MORBID OBESITY: ICD-10-CM

## 2025-03-21 DIAGNOSIS — I10 ESSENTIAL HYPERTENSION, BENIGN: ICD-10-CM

## 2025-03-21 DIAGNOSIS — R06.09 DYSPNEA ON EXERTION: ICD-10-CM

## 2025-03-21 DIAGNOSIS — R53.83 OTHER FATIGUE: ICD-10-CM

## 2025-03-21 DIAGNOSIS — I49.3 PVC'S (PREMATURE VENTRICULAR CONTRACTIONS): ICD-10-CM

## 2025-03-21 PROCEDURE — C8929 TTE W OR WO FOL WCON,DOPPLER: HCPCS | Performed by: INTERNAL MEDICINE

## 2025-03-21 RX ADMIN — PERFLUTREN 10 ML: 6.52 INJECTION, SUSPENSION INTRAVENOUS at 15:45

## 2025-03-22 ENCOUNTER — RESULTS FOLLOW-UP (OUTPATIENT)
Age: 40
End: 2025-03-22

## 2025-03-22 LAB
ECHO AO ASC DIAM: 3 CM
ECHO AO ASCENDING AORTA INDEX: 1.02 CM/M2
ECHO AV MEAN GRADIENT: 3 MMHG
ECHO AV MEAN VELOCITY: 0.9 M/S
ECHO AV PEAK GRADIENT: 6 MMHG
ECHO AV PEAK VELOCITY: 1.2 M/S
ECHO AV VELOCITY RATIO: 0.58
ECHO AV VTI: 25.7 CM
ECHO BSA: 3.12 M2
ECHO LA DIAMETER INDEX: 1.5 CM/M2
ECHO LA DIAMETER: 4.4 CM
ECHO LV E' LATERAL VELOCITY: 14.27 CM/S
ECHO LV E' SEPTAL VELOCITY: 14.2 CM/S
ECHO LV EF PHYSICIAN: 55 %
ECHO LV FRACTIONAL SHORTENING: 34 % (ref 28–44)
ECHO LV INTERNAL DIMENSION DIASTOLE INDEX: 2.11 CM/M2
ECHO LV INTERNAL DIMENSION DIASTOLIC: 6.2 CM (ref 4.2–5.9)
ECHO LV INTERNAL DIMENSION SYSTOLIC INDEX: 1.39 CM/M2
ECHO LV INTERNAL DIMENSION SYSTOLIC: 4.1 CM
ECHO LV IVSD: 0.9 CM (ref 0.6–1)
ECHO LV MASS 2D: 244.5 G (ref 88–224)
ECHO LV MASS INDEX 2D: 83.2 G/M2 (ref 49–115)
ECHO LV POSTERIOR WALL DIASTOLIC: 1 CM (ref 0.6–1)
ECHO LV RELATIVE WALL THICKNESS RATIO: 0.32
ECHO LVOT AV VTI INDEX: 0.5
ECHO LVOT MEAN GRADIENT: 1 MMHG
ECHO LVOT PEAK GRADIENT: 2 MMHG
ECHO LVOT PEAK VELOCITY: 0.7 M/S
ECHO LVOT VTI: 12.8 CM
ECHO MV A VELOCITY: 0.64 M/S
ECHO MV AREA PHT: 4.4 CM2
ECHO MV E DECELERATION TIME (DT): 172.8 MS
ECHO MV E VELOCITY: 0.94 M/S
ECHO MV E/A RATIO: 1.47
ECHO MV E/E' LATERAL: 6.59
ECHO MV E/E' RATIO (AVERAGED): 6.6
ECHO MV E/E' SEPTAL: 6.62
ECHO MV LVOT VTI INDEX: 2.66
ECHO MV MAX VELOCITY: 1.2 M/S
ECHO MV MEAN GRADIENT: 3 MMHG
ECHO MV MEAN VELOCITY: 0.8 M/S
ECHO MV PEAK GRADIENT: 5 MMHG
ECHO MV PRESSURE HALF TIME (PHT): 50.1 MS
ECHO MV VTI: 34.1 CM
ECHO RV FREE WALL PEAK S': 13.3 CM/S
ECHO RV TAPSE: 3.4 CM (ref 1.7–?)

## 2025-03-22 PROCEDURE — 93306 TTE W/DOPPLER COMPLETE: CPT | Performed by: INTERNAL MEDICINE

## 2025-03-22 PROCEDURE — PBSHW PBB SHADOW CHARGE: Performed by: INTERNAL MEDICINE

## 2025-04-15 ENCOUNTER — RESULTS FOLLOW-UP (OUTPATIENT)
Age: 40
End: 2025-04-15

## 2025-04-15 ENCOUNTER — HOSPITAL ENCOUNTER (OUTPATIENT)
Facility: HOSPITAL | Age: 40
Discharge: HOME | End: 2025-04-18
Attending: INTERNAL MEDICINE
Payer: MEDICAID

## 2025-04-15 DIAGNOSIS — R06.09 DYSPNEA ON EXERTION: ICD-10-CM

## 2025-04-15 DIAGNOSIS — I10 ESSENTIAL HYPERTENSION, BENIGN: ICD-10-CM

## 2025-04-15 DIAGNOSIS — R53.83 OTHER FATIGUE: ICD-10-CM

## 2025-04-15 DIAGNOSIS — E66.01 MORBID OBESITY: ICD-10-CM

## 2025-04-15 DIAGNOSIS — I49.3 PVC'S (PREMATURE VENTRICULAR CONTRACTIONS): ICD-10-CM

## 2025-04-15 PROCEDURE — 6360000004 HC RX CONTRAST MEDICATION: Performed by: INTERNAL MEDICINE

## 2025-04-15 PROCEDURE — 75574 CT ANGIO HRT W/3D IMAGE: CPT

## 2025-04-15 PROCEDURE — 75574 CT ANGIO HRT W/3D IMAGE: CPT | Performed by: INTERNAL MEDICINE

## 2025-04-15 PROCEDURE — 6370000000 HC RX 637 (ALT 250 FOR IP): Performed by: INTERNAL MEDICINE

## 2025-04-15 RX ORDER — IOPAMIDOL 755 MG/ML
100 INJECTION, SOLUTION INTRAVASCULAR
Status: COMPLETED | OUTPATIENT
Start: 2025-04-15 | End: 2025-04-15

## 2025-04-15 RX ORDER — NITROGLYCERIN 0.4 MG/1
0.8 TABLET SUBLINGUAL ONCE
Status: COMPLETED | OUTPATIENT
Start: 2025-04-15 | End: 2025-04-15

## 2025-04-15 RX ADMIN — IOPAMIDOL 100 ML: 755 INJECTION, SOLUTION INTRAVENOUS at 09:27

## 2025-04-15 RX ADMIN — NITROGLYCERIN 0.8 MG: 0.4 TABLET SUBLINGUAL at 09:16

## 2025-04-15 RX ADMIN — IOPAMIDOL 100 ML: 755 INJECTION, SOLUTION INTRAVENOUS at 09:41

## 2025-04-15 NOTE — PROGRESS NOTES
Patient brought back from the waiting room in preparation of CT Coronary scan.    Patient was prescribed oral beta blocker protocol to be taken at home prior to the exam.    20 gauge diffusics IV initiated in the right AC.  Brisk blood return.      POC lab studies were not drawn.    Current vitals are as follows:    /68  HR 64    Confirmed with CT at 0915 to verify if they are ready for the patient    Ambulatory to CT at 0916    Nitroglycerin 0.8mg given to patient at 0916 in CT    Test completed, vitals after exam are as follows:    /81  HR 76    Patients IV removed by CT tech, and patient discharged.  Denies dizziness upon arising.    Gladis Liu RN          [place]

## (undated) DEVICE — GLOVE ORANGE PI 7   MSG9070

## (undated) DEVICE — ZIMMER® STERILE DISPOSABLE TOURNIQUET CUFF WITH PROTECTIVE SLEEVE AND PLC, DUAL PORT, SINGLE BLADDER, 18 IN. (46 CM)

## (undated) DEVICE — BANDAGE,GAUZE,BULKEE II,4.5"X4.1YD,STRL: Brand: MEDLINE

## (undated) DEVICE — GLOVE ORANGE PI 7 1/2   MSG9075

## (undated) DEVICE — STRIP,CLOSURE,WOUND,MEDI-STRIP,1/2X4: Brand: MEDLINE

## (undated) DEVICE — SKIN PREP TRAY W/CHG: Brand: MEDLINE INDUSTRIES, INC.

## (undated) DEVICE — DRSG GZ OIL EMUL CURAD 3X3 --

## (undated) DEVICE — SOL IRRIGATION INJ NACL 0.9% 500ML BTL

## (undated) DEVICE — GAUZE,SPONGE,FLUFF,6"X6.75",STRL,5/TRAY: Brand: MEDLINE

## (undated) DEVICE — KIT,ANTI FOG,W/SPONGE & FLUID,SOFT PACK: Brand: MEDLINE

## (undated) DEVICE — STANDARD (U) BLADE ASSEMBLY 6PK: Brand: MICROAIRE®

## (undated) DEVICE — GLOVE SURG SZ 6 THK91MIL LTX FREE SYN POLYISOPRENE ANTI

## (undated) DEVICE — MASTISOL ADHESIVE LIQ 2/3ML

## (undated) DEVICE — SUT PROL 5-0 18IN P3 BLU --

## (undated) DEVICE — BANDAGE COMPR W3INXL5YD TAN POLY COHESIVE CARING SGL

## (undated) DEVICE — TOWEL,OR,DSP,ST,BLUE,STD,2/PK,40PK/CS: Brand: MEDLINE

## (undated) DEVICE — BLADE OPHTH 180DEG CUT SURF BLU STR SHRP DBL BVL GRINDLESS

## (undated) DEVICE — HAND-SFMCASU: Brand: MEDLINE INDUSTRIES, INC.